# Patient Record
Sex: FEMALE | Race: WHITE | Employment: FULL TIME
[De-identification: names, ages, dates, MRNs, and addresses within clinical notes are randomized per-mention and may not be internally consistent; named-entity substitution may affect disease eponyms.]

---

## 2017-01-09 ENCOUNTER — SURGERY (OUTPATIENT)
Age: 47
End: 2017-01-09

## 2017-02-07 ENCOUNTER — HOSPITAL ENCOUNTER (OUTPATIENT)
Facility: CLINIC | Age: 47
End: 2017-02-07
Attending: OPHTHALMOLOGY | Admitting: OPHTHALMOLOGY
Payer: COMMERCIAL

## 2017-02-20 ENCOUNTER — HOSPITAL ENCOUNTER (OUTPATIENT)
Facility: CLINIC | Age: 47
Discharge: HOME | End: 2017-02-20
Attending: OPHTHALMOLOGY | Admitting: OPHTHALMOLOGY
Payer: COMMERCIAL

## 2017-02-20 ENCOUNTER — SURGERY (OUTPATIENT)
Age: 47
End: 2017-02-20

## 2017-02-20 PROCEDURE — 65760 KERATOMILEUSIS: CPT | Performed by: OPHTHALMOLOGY

## 2017-02-20 NOTE — OR NURSING
I have completed the appropriate paper charting.  Please see paper charting for meds and other documentation.

## 2017-03-17 ENCOUNTER — HOSPITAL ENCOUNTER (EMERGENCY)
Facility: CLINIC | Age: 47
Discharge: HOME | End: 2017-03-17
Attending: EMERGENCY MEDICINE | Admitting: EMERGENCY MEDICINE
Payer: COMMERCIAL

## 2017-03-17 VITALS
RESPIRATION RATE: 18 BRPM | WEIGHT: 173 LBS | SYSTOLIC BLOOD PRESSURE: 125 MMHG | BODY MASS INDEX: 29.7 KG/M2 | DIASTOLIC BLOOD PRESSURE: 81 MMHG | TEMPERATURE: 98 F | OXYGEN SATURATION: 99 % | HEART RATE: 77 BPM

## 2017-03-17 DIAGNOSIS — N39.0 UTI (URINARY TRACT INFECTION) WITH PYURIA: ICD-10-CM

## 2017-03-17 LAB
ALBUMIN UR-MCNC: NEGATIVE MG/DL
APPEARANCE UR: ABNORMAL
BACTERIA #/AREA URNS HPF: ABNORMAL /HPF
BILIRUB UR QL STRIP: NEGATIVE
COLOR UR AUTO: ABNORMAL
GLUCOSE UR STRIP-MCNC: NEGATIVE MG/DL
HCG UR QL: NEGATIVE
HGB UR QL STRIP: ABNORMAL
KETONES UR STRIP-MCNC: NEGATIVE MG/DL
LEUKOCYTE ESTERASE UR QL STRIP: ABNORMAL
MUCOUS THREADS #/AREA URNS LPF: PRESENT /LPF
NITRATE UR QL: POSITIVE
PH UR STRIP: 7 PH (ref 5–7)
RBC #/AREA URNS AUTO: 7 /HPF (ref 0–2)
SP GR UR STRIP: 1 (ref 1–1.03)
SQUAMOUS #/AREA URNS AUTO: 3 /HPF (ref 0–1)
URN SPEC COLLECT METH UR: ABNORMAL
UROBILINOGEN UR STRIP-MCNC: 0 MG/DL (ref 0–2)
WBC #/AREA URNS AUTO: >182 /HPF (ref 0–2)
WBC CLUMPS #/AREA URNS HPF: PRESENT /HPF

## 2017-03-17 PROCEDURE — 81001 URINALYSIS AUTO W/SCOPE: CPT | Performed by: EMERGENCY MEDICINE

## 2017-03-17 PROCEDURE — 99283 EMERGENCY DEPT VISIT LOW MDM: CPT

## 2017-03-17 PROCEDURE — 81025 URINE PREGNANCY TEST: CPT | Performed by: EMERGENCY MEDICINE

## 2017-03-17 RX ORDER — NITROFURANTOIN 25; 75 MG/1; MG/1
100 CAPSULE ORAL 2 TIMES DAILY
Qty: 20 CAPSULE | Refills: 0 | Status: SHIPPED | OUTPATIENT
Start: 2017-03-17 | End: 2017-03-27

## 2017-03-17 ASSESSMENT — ENCOUNTER SYMPTOMS
FEVER: 0
FREQUENCY: 1
FLANK PAIN: 0
HEMATURIA: 1

## 2017-03-17 NOTE — DISCHARGE INSTRUCTIONS
"   * BLADDER INFECTION,Female (Adult)    A bladder infection (\"cystitis\" or \"UTI\") usually causes a constant urge to urinate and a burning when passing urine. Urine may be cloudy, smelly or dark. There may be pain in the lower abdomen. A bladder infection occurs when bacteria from the vaginal area enter the bladder opening (urethra). This can occur from sexual intercourse, wearing tight clothing, dehydration and other factors.  HOME CARE:  1. Drink lots of fluids (at least 6-8 glasses a day, unless you must restrict fluids for other medical reasons). This will force the medicine into your urinary system and flush the bacteria out of your body. Cranberry juice has been shown to help clear out the bacteria.  2. Avoid sexual intercourse until your symptoms are gone.  3. A bladder infection is treated with antibiotics. You may also be given Pyridium (generic = phenazopyridine) to reduce the burning sensation. This medicine will cause your urine to become a bright orange color. The orange urine may stain clothing. You may wear a pad or panty-liner to protect clothing.  PREVENTING FUTURE INFECTIONS:  1. Always wipe from front to back after a bowel movement.  2. Keep the genital area clean and dry.  3. Drink plenty of fluids each day to avoid dehydration.  4. Urinate right after intercourse to flush out the bladder.  5. Wear cotton underwear and cotton-lined panty hose; avoid tight-fitting pants.  6. If you are on birth control pills and are having frequent bladder infections, discuss with your doctor.  FOLLOW UP: Return to this facility or see your doctor if ALL symptoms are not gone after three days of treatment.  GET PROMPT MEDICAL ATTENTION if any of the following occur:    Fever over 101 F (38.3 C)    No improvement by the third day of treatment    Increasing back or abdominal pain    Repeated vomiting; unable to keep medicine down    Weakness, dizziness or fainting    Vaginal discharge    Pain, redness or swelling in " the labia (outer vaginal area)    7154-1080 [employer], [address] [place] [address], [place] [address]. All rights reserved. This information is not intended as a substitute for professional medical care. Always follow your healthcare professional's instructions.

## 2017-03-17 NOTE — ED NOTES
D/c instructions reviewed with pt educated on follow-up and home care, given prescription for Macrobid

## 2017-03-17 NOTE — ED AVS SNAPSHOT
[facility] Emergency Department    [address]    [facility] [address]    Phone:  [phone]    Fax:  [phone]                                       Billie Tavarez   MRN: [id number]    Department:  [facility] Emergency Department   Date of Visit:  3/17/2017           After Visit Summary Signature Page     I have received my discharge instructions, and my questions have been answered. I have discussed any challenges I see with this plan with the nurse or doctor.    ..........................................................................................................................................  Patient/Patient Representative Signature      ..........................................................................................................................................  Patient Representative Print Name and Relationship to Patient    ..................................................               ................................................  Date                                            Time    ..........................................................................................................................................  Reviewed by Signature/Title    ...................................................              ..............................................  Date                                                            Time

## 2017-03-17 NOTE — ED PROVIDER NOTES
History     Chief Complaint:  Rule out Urinary Tract Infection      The history is provided by the patient.      Billie Tavarez is a 46 year old female who presents to rule out urinary tract infection.  Patient had onset of urinary frequency yesterday.  She took AZO twice yesterday but then experienced hematuria in the evening prompting visit to the emergency department.  She has had urinary tract infections in remote past but does note this is similar to past UTIs.  She denies fevers, flank pain, or other complaint. No vomiting or flank pain or fevers to suggest pyelonephritis.    Allergies:  Codeine  Sulfa Drugs      Medications:    Hydroquinone cream  Ativan  Ortho evra patch  Omeprazole  Vitamin D    Past Medical History:    Rosacea  Inflamed seborrheic keratosis   Dyschromia  Colon polyp  Obesity  Anxiety  Diarrhea  Atopic rhinits  GERD  Depression   IBS  Gluten intolerance   UTI's    Past Surgical History:    Right hip arthroplasty  Tubal ligation  Tonsillectomy  Lasik    Family History:    Diabetes  CAD  HTN  Mood disorder  OA    Social History:  Presents with spouse   Tobacco use: Former 0.50 PPD for 6 years, QD 1995  Alcohol use: Socially  PCP: Davey Arteaga    Marital Status:          Review of Systems   Constitutional: Negative for fever.   Genitourinary: Positive for frequency and hematuria. Negative for flank pain.   All other systems reviewed and are negative.  Here with a 24 hour history of frequency . Has had 6 bladder infections in her lifetime. Now tonight noted blood in urine. Says she has started on AZO earlier today     Physical Exam     Patient Vitals for the past 24 hrs:   BP Temp Pulse Resp SpO2 Weight   03/17/17 0036 133/84 98  F (36.7  C) 77 18 100 % 78.5 kg (173 lb)        Physical Exam  GEN: patient smiling, no distress  HEAD: atraumatic, normocephalic  EYES: pupils reactive, extraocular muscles intact, conjunctivae normal  ENT: TMs flat and white bilaterally, oropharynx  normal with no erythema or exudate, mucus membranes moist  NECK: no cervical LAD  RESPIRATORY: no tachypnea, breath sounds clear to auscultation (no rales, wheezes, rhonchi)  CVS: normal S1/S2, no murmurs/rubs/gallops  ABDOMEN: soft, nontender, no masses or organomegaly, no rebound, positive bowel sounds  EXTREMITIES: intact pulses x 2 (radial pulses intact), no edema  SKIN: warm and dry  NEURO:  Overall symmetrical exam  HEME: no bruising      Emergency Department Course   Laboratory:  UA: Blood large, Nitrite positive, Leuk esterase large, WBC>182 (H), RBC 7 (H), WBC clumps present, Bacteria moderate, Squam epithelial 3 (H), Mucous present, o/w Negative   UPT: Negative    Emergency Department Course:  Past medical records, nursing notes, and vitals reviewed.  0057: I performed an exam of the patient and obtained history, as documented above.   Above workup undertaken.  I personally reviewed the laboratory results with the Patient and spouse and answered all related questions prior to discharge.   0125: I rechecked the patient.  Findings and plan explained to the Patient and spouse. Patient discharged home with instructions regarding supportive care, medications, and reasons to return. The importance of close follow-up was reviewed.        Impression & Plan    Medical Decision Making:  Billie Tavarez is a 46 year old female who presents for evaluation of urinary frequency and hematuria.  This clinically is consistent with a urinary tract infection.  Urinalysis confirms the infection.  There has been no fever, back/flank pain or significant abdominal pain.  There is no clinical evidence of pyelonephritis, appendicitis, colitis, diverticulitis or any intraabdominal catastrophe. The patient will be started on antibiotics for the infection. Return if increasing pain, vomiting, fever, or inability to tolerate the oral antibiotic.  Follow up with primary physician is indicated if not improving in 2-3 days.       Diagnosis:    ICD-10-CM    1. UTI (urinary tract infection) with pyuria N39.0        Disposition:  Discharged to home with plan as outlined.    Discharge Medications:  New Prescriptions    NITROFURANTOIN, MACROCRYSTAL-MONOHYDRATE, (MACROBID) 100 MG CAPSULE    Take 1 capsule (100 mg) by mouth 2 times daily for 10 days         Chas Tariq  3/17/2017   [facility] EMERGENCY DEPARTMENT    I, Chas Tariq, am serving as a scribe at 12:57 AM on 3/17/2017 to document services personally performed by Dianne Quiroga MD based on my observations and the provider's statements to me.       Dianne Quiroga MD  03/17/17 0334

## 2017-03-17 NOTE — ED AVS SNAPSHOT
" [facility] Emergency Department    [address] [place] [address]    Phone:  [phone]    Fax:  [phone]                                       Billie Tavarez   MRN: [id number]    Department:  [facility] Emergency Department   Date of Visit:  3/17/2017           Patient Information     Date Of Birth          1970        Your diagnoses for this visit were:     UTI (urinary tract infection) with pyuria        You were seen by Dianne Quiroga MD.      Follow-up Information     Follow up with Davey Arteaga PA-C.    Specialty:  Physician Assistant    Contact information:    [facility]  [address]  [facility] [address] [phone]          Discharge Instructions          * BLADDER INFECTION,Female (Adult)    A bladder infection (\"cystitis\" or \"UTI\") usually causes a constant urge to urinate and a burning when passing urine. Urine may be cloudy, smelly or dark. There may be pain in the lower abdomen. A bladder infection occurs when bacteria from the vaginal area enter the bladder opening (urethra). This can occur from sexual intercourse, wearing tight clothing, dehydration and other factors.  HOME CARE:  1. Drink lots of fluids (at least 6-8 glasses a day, unless you must restrict fluids for other medical reasons). This will force the medicine into your urinary system and flush the bacteria out of your body. Cranberry juice has been shown to help clear out the bacteria.  2. Avoid sexual intercourse until your symptoms are gone.  3. A bladder infection is treated with antibiotics. You may also be given Pyridium (generic = phenazopyridine) to reduce the burning sensation. This medicine will cause your urine to become a bright orange color. The orange urine may stain clothing. You may wear a pad or panty-liner to protect clothing.  PREVENTING FUTURE INFECTIONS:  1. Always wipe from front to back after a bowel movement.  2. Keep " the genital area clean and dry.  3. Drink plenty of fluids each day to avoid dehydration.  4. Urinate right after intercourse to flush out the bladder.  5. Wear cotton underwear and cotton-lined panty hose; avoid tight-fitting pants.  6. If you are on birth control pills and are having frequent bladder infections, discuss with your doctor.  FOLLOW UP: Return to this facility or see your doctor if ALL symptoms are not gone after three days of treatment.  GET PROMPT MEDICAL ATTENTION if any of the following occur:    Fever over 101 F (38.3 C)    No improvement by the third day of treatment    Increasing back or abdominal pain    Repeated vomiting; unable to keep medicine down    Weakness, dizziness or fainting    Vaginal discharge    Pain, redness or swelling in the labia (outer vaginal area)    8643-8873 [employer], [address] [place] [address], [place] [id number]. All rights reserved. This information is not intended as a substitute for professional medical care. Always follow your healthcare professional's instructions.      24 Hour Appointment Hotline       To make an appointment at any [facility], call [phone] ([phone]). If you don't have a family doctor or clinic, we will help you find one. [facility] are conveniently located to serve the needs of you and your family.             Review of your medicines      START taking        Dose / Directions Last dose taken    nitrofurantoin (macrocrystal-monohydrate) 100 MG capsule   Commonly known as:  MACROBID   Dose:  100 mg   Quantity:  20 capsule        Take 1 capsule (100 mg) by mouth 2 times daily for 10 days   Refills:  0          Our records show that you are taking the medicines listed below. If these are incorrect, please call your family doctor or clinic.        Dose / Directions Last dose taken    fluticasone 50 MCG/ACT spray   Commonly known as:  FLONASE   Dose:  1-2 spray   Quantity:  1 Bottle        Spray 1-2 sprays into both  nostrils daily   Refills:  1        hydroquinone 4 % Crea   Quantity:  30 g        Apply thin layer BID x 4-6 months then d/c   Refills:  5        LORazepam 0.5 MG tablet   Commonly known as:  ATIVAN   Dose:  0.5 mg   Quantity:  20 tablet        Take 1 tablet (0.5 mg) by mouth as needed for anxiety   Refills:  0        norelgestromin-ethinyl estradiol 150-35 MCG/24HR patch   Commonly known as:  ORTHO EVRA   Dose:  1 patch   Quantity:  12 patch        Place 1 patch onto the skin once a week   Refills:  3        omeprazole 20 MG tablet        Refills:  0        vitamin D 2000 UNITS tablet   Dose:  1 tablet        Take 1 tablet by mouth daily   Refills:  0                Prescriptions were sent or printed at these locations (1 Prescription)                   Other Prescriptions                Printed at Department/Unit printer (1 of 1)         nitrofurantoin, macrocrystal-monohydrate, (MACROBID) 100 MG capsule                Procedures and tests performed during your visit     HCG qualitative urine    UA with Microscopic      Orders Needing Specimen Collection     None      Pending Results     No orders found from 3/15/2017 to 3/18/2017.            Pending Culture Results     No orders found from 3/15/2017 to 3/18/2017.             Test Results from your hospital stay     3/17/2017  1:14 AM - Interface, [employer] [facility] Results      Component Results     Component Value Ref Range & Units Status    Color Urine [place]  Final    Appearance Urine Slightly Cloudy  Final    Glucose Urine Negative NEG mg/dL Final    Bilirubin Urine Negative NEG Final    Ketones Urine Negative NEG mg/dL Final    Specific Gravity Urine 1.003 1.003 - 1.035 Final    Blood Urine Large (A) NEG Final    pH Urine 7.0 5.0 - 7.0 pH Final    Protein Albumin Urine Negative NEG mg/dL Final    Urobilinogen mg/dL 0.0 0.0 - 2.0 mg/dL Final    Nitrite Urine Positive (A) NEG Final    Leukocyte Esterase Urine Large (A) NEG Final    Source Midstream Urine  Final    WBC  Urine >182 (H) 0 - 2 /HPF Final    RBC Urine 7 (H) 0 - 2 /HPF Final    WBC Clumps Present (A) NEG /HPF Final    Bacteria Urine Moderate (A) NEG /HPF Final    Squamous Epithelial /HPF Urine 3 (H) 0 - 1 /HPF Final    Mucous Urine Present (A) NEG /LPF Final         3/17/2017  1:11 AM - Interface, [employer] Results      Component Results     Component Value Ref Range & Units Status    HCG Qual Urine Negative NEG Final                Clinical Quality Measure: Blood Pressure Screening     Your blood pressure was checked while you were in the emergency department today. The last reading we obtained was  BP: 133/84 . Please read the guidelines below about what these numbers mean and what you should do about them.  If your systolic blood pressure (the top number) is less than 120 and your diastolic blood pressure (the bottom number) is less than 80, then your blood pressure is normal. There is nothing more that you need to do about it.  If your systolic blood pressure (the top number) is 120-139 or your diastolic blood pressure (the bottom number) is 80-89, your blood pressure may be higher than it should be. You should have your blood pressure rechecked within a year by a primary care provider.  If your systolic blood pressure (the top number) is 140 or greater or your diastolic blood pressure (the bottom number) is 90 or greater, you may have high blood pressure. High blood pressure is treatable, but if left untreated over time it can put you at risk for heart attack, stroke, or kidney failure. You should have your blood pressure rechecked by a primary care provider within the next 4 weeks.  If your provider in the emergency department today gave you specific instructions to follow-up with your doctor or provider even sooner than that, you should follow that instruction and not wait for up to 4 weeks for your follow-up visit.        Thank you for choosing [facility]       Thank you for choosing [facility] for your care. Our  goal is always to provide you with excellent care. Hearing back from our patients is one way we can continue to improve our services. Please take a few minutes to complete the written survey that you may receive in the mail after you visit with us. Thank you!        [employer] Information     [employer] gives you secure access to your electronic health record. If you see a primary care provider, you can also send messages to your care team and make appointments. If you have questions, please call your primary care clinic.  If you do not have a primary care provider, please call [phone] and they will assist you.        Care EveryWhere ID     This is your Care EveryWhere ID. This could be used by other organizations to access your [facility] medical records  [id number]        After Visit Summary       This is your record. Keep this with you and show to your community pharmacist(s) and doctor(s) at your next visit.

## 2017-03-17 NOTE — ED NOTES
Here with a 24 hour history of frequency . Has had 6 bladder infections in her lifetime. Now tonight noted blood in urine. Says she has started on AZO earlier today

## 2017-03-20 ENCOUNTER — TELEPHONE (OUTPATIENT)
Dept: FAMILY MEDICINE | Facility: CLINIC | Age: 47
End: 2017-03-20

## 2017-03-20 DIAGNOSIS — N30.01 ACUTE CYSTITIS WITH HEMATURIA: Primary | ICD-10-CM

## 2017-03-20 RX ORDER — CEFDINIR 300 MG/1
300 CAPSULE ORAL 2 TIMES DAILY
Qty: 14 CAPSULE | Refills: 0 | Status: SHIPPED | OUTPATIENT
Start: 2017-03-20 | End: 2017-03-21 | Stop reason: ALTCHOICE

## 2017-03-20 NOTE — TELEPHONE ENCOUNTER
Spoke with pt let her know prescription was sent to pharmacy. Also that if symptoms continue she will need a follow up visit.  Corey Manuel CMA

## 2017-03-20 NOTE — TELEPHONE ENCOUNTER
Pt calls.  She was seen in the ER, with a uti.  She was started on an antibiotic.  She says she was told if it was not getting better, she should call her primary to get it changed.  It looks like the pt was to f/u with pcp in 2-3 days if not improving.  She says she has mild symptoms.  She feels like she has to go, but it is not that urgency, mild spasming, but nothing terrible and she says she still feels really off.      She says usually she is cleared by now and feeling amazing with other utis.      It improved initially.  She does not feel as good today as yesterday.  She is drinking lots of fluids.      Will forward to Alejandro Arteaga for advisal.

## 2017-03-21 ENCOUNTER — TELEPHONE (OUTPATIENT)
Dept: FAMILY MEDICINE | Facility: CLINIC | Age: 47
End: 2017-03-21

## 2017-03-21 DIAGNOSIS — R31.9 URINARY TRACT INFECTION WITH HEMATURIA, SITE UNSPECIFIED: Primary | ICD-10-CM

## 2017-03-21 DIAGNOSIS — N39.0 URINARY TRACT INFECTION WITH HEMATURIA, SITE UNSPECIFIED: Primary | ICD-10-CM

## 2017-03-21 RX ORDER — CIPROFLOXACIN 500 MG/1
500 TABLET, FILM COATED ORAL 2 TIMES DAILY
Qty: 14 TABLET | Refills: 0 | Status: SHIPPED | OUTPATIENT
Start: 2017-03-21 | End: 2017-05-31

## 2017-03-21 NOTE — TELEPHONE ENCOUNTER
Patient is calling back today, she has not picked up the antibiotic yet, is wondering if the antibiotic you prescribed will also cover her for a cat scratch that she got last night?  She is also using bacitracin and band aid to the scratch. It is not red or draining. (cefdinir)  Rosario Bellamy, RN  Triage Nurse

## 2017-03-21 NOTE — TELEPHONE ENCOUNTER
Ok i will change. Have her start the antibiotic and keep the area clean. She should follow up if not improving.

## 2017-05-31 ENCOUNTER — OFFICE VISIT (OUTPATIENT)
Dept: FAMILY MEDICINE | Facility: CLINIC | Age: 47
End: 2017-05-31
Payer: COMMERCIAL

## 2017-05-31 VITALS
DIASTOLIC BLOOD PRESSURE: 74 MMHG | HEIGHT: 64 IN | HEART RATE: 67 BPM | WEIGHT: 170.7 LBS | OXYGEN SATURATION: 97 % | SYSTOLIC BLOOD PRESSURE: 118 MMHG | BODY MASS INDEX: 29.14 KG/M2 | TEMPERATURE: 98.3 F

## 2017-05-31 DIAGNOSIS — N90.89 LESION OF LABIA: Primary | ICD-10-CM

## 2017-05-31 PROCEDURE — 99213 OFFICE O/P EST LOW 20 MIN: CPT | Performed by: PHYSICIAN ASSISTANT

## 2017-05-31 ASSESSMENT — ENCOUNTER SYMPTOMS
ABDOMINAL PAIN: 0
HEADACHES: 0
DIARRHEA: 0
VOMITING: 0
FEVER: 0
NAUSEA: 0
SHORTNESS OF BREATH: 0
FOCAL WEAKNESS: 0
CHILLS: 0

## 2017-05-31 NOTE — PROGRESS NOTES
HPI  SUBJECTIVE:      Billie Tavarez is a 47 year old female who presents to clinic today for the following health issues:     Lump around vaginal area       Duration: 2 days     Description (location/character/radiation): Lump in the labia on L side. Hard and internal. No pain     Intensity:  mild    Accompanying signs and symptoms: none     History (similar episodes/previous evaluation): None    Precipitating or alleviating factors: None    Therapies tried and outcome: None         Additional complaints: None    HPI additional notes:  Pt denies vaginal discharge, urinary sx, or tenderness/pain. Lump has not increased in size.       Chart Review:  History   Smoking Status     Former Smoker     Packs/day: 0.50     Years: 6.00     Types: Cigarettes     Quit date: 3/3/1995   Smokeless Tobacco     Never Used     Comment: quit 1994     PHQ-9 SCORE 4/20/2015 10/20/2015 10/31/2016   Total Score 0 - -   Total Score - 4 5     SHON-7 SCORE 10/20/2015 5/6/2016 10/31/2016   Total Score - - -   Total Score 14 6 8       Patient Active Problem List   Diagnosis     Mild major depression (H)     CARDIOVASCULAR SCREENING; LDL GOAL LESS THAN 160     GERD (gastroesophageal reflux disease)     Atopic rhinitis     Family history of diabetes mellitus     Family history of coronary artery disease     Elevated rheumatoid factor     Obesity     Anxiety     Diarrhea     Colon polyp     Rash     Rosacea     Inflamed seborrheic keratosis     Dyschromia     Vitamin D deficiency     Past Surgical History:   Procedure Laterality Date     ARTHROPLASTY HIP      R     ENHANCE LASER REFRACTIVE EXISTING PT IN PARAMETERS Right 2/20/2017    Procedure: ENHANCE LASER REFRACTIVE EXISTING PT IN PARAMETERS;  Surgeon: Leandro Martinez MD;  Location: [facility]     LASIK Left 1/18/2016    Procedure: LASIK;  Surgeon: Leandro Martinez MD;  Location: [facility]     LASIK CUSTOMVUE Right 1/9/2017    Procedure: LASIK CUSTOMVUE;  Surgeon: Leandro Martinez MD;  Location:  " [facility]     TONSILLECTOMY       TUBAL LIGATION       WAVESCAN SCREENING Left 1/18/2016    Procedure: WAVESCAN SCREENING;  Surgeon: Leandro Martinez MD;  Location:  [facility]     WAVESCAN SCREENING Right 1/9/2017    Procedure: WAVESCAN SCREENING;  Surgeon: Laendro Martinez MD;  Location:  [facility]     WAVESCAN SCREENING Right 2/20/2017    Procedure: WAVESCAN SCREENING;  Surgeon: Leandro Martinez MD;  Location: [facility]     Problem list, Medication list, Allergies, Medical/Social/Surg hx reviewed in [facility], updated as appropriate.      Review of Systems   Constitutional: Negative for chills and fever.   Respiratory: Negative for shortness of breath.    Cardiovascular: Negative for chest pain.   Gastrointestinal: Negative for abdominal pain, diarrhea, nausea and vomiting.   Genitourinary:        Lump on labia   Skin: Negative for rash.   Neurological: Negative for focal weakness and headaches.   All other systems reviewed and are negative.        Physical Exam   Constitutional: She is oriented to person, place, and time and well-developed, well-nourished, and in no distress.   HENT:   Head: Normocephalic and atraumatic.   Cardiovascular: Normal rate, regular rhythm and normal heart sounds.    Pulmonary/Chest: Effort normal and breath sounds normal.   Genitourinary: Vulva exhibits lesion (0.5 cm firm round papule with regular borders to L labia minora- pink in color with a small gray area. No tenderness). Vulva exhibits no tenderness.   Musculoskeletal: Normal range of motion.   Neurological: She is alert and oriented to person, place, and time. Gait normal.   Skin: Skin is warm and dry.   Nursing note and vitals reviewed.      Vital Signs  /74  Pulse 67  Temp 98.3  F (36.8  C) (Oral)  Ht 5' 4\" (1.626 m)  Wt 170 lb 11.2 oz (77.4 kg)  SpO2 97%  BMI 29.3 kg/m2   Body mass index is 29.3 kg/(m^2).    Diagnostic Test Results:  none     ASSESSMENT/PLAN:                                                        ICD-10-CM    1. " Lesion of labia N90.89      Lesion of unknown etiology to left labia. Does not appear infectious. Regular borders are reassuring as I feel a malignancy is less likely, but I would like pt to f/u with gynecology for further work-up. An appt was made for 6/15 with GYN.    I have discussed any lab or imaging results, the patient's diagnosis, and my plan of treatment with the patient and/or family. Patient is aware to come back in if with worsening symptoms or if no relief despite treatment plan.  Patient voiced understanding and had no further questions.       Follow Up: Data Unavailable    JULIA Amor, PA-C  [facility]

## 2017-05-31 NOTE — PROGRESS NOTES
"Chief Complaint   Patient presents with     Vaginal Problem       Initial /74  Pulse 67  Temp 98.3  F (36.8  C) (Oral)  Ht 5' 4\" (1.626 m)  Wt 170 lb 11.2 oz (77.4 kg)  SpO2 97%  BMI 29.3 kg/m2 Estimated body mass index is 29.3 kg/(m^2) as calculated from the following:    Height as of this encounter: 5' 4\" (1.626 m).    Weight as of this encounter: 170 lb 11.2 oz (77.4 kg).  Medication Reconciliation: complete     Carmen Higuera MA      "

## 2017-05-31 NOTE — MR AVS SNAPSHOT
After Visit Summary   5/31/2017    Billie Tavarez    MRN: [id number]           Patient Information     Date Of Birth          1970        Visit Information        Provider Department      5/31/2017 1:20 PM Kaela Stevens PA-C [facility]        Today's Diagnoses     Lesion of labia    -  1      Care Instructions    Follow up with gynecology on 6/15 at 3:15pm.              Follow-ups after your visit        Your next 10 appointments already scheduled     Tom 15, 2017  3:15 PM CDT   SHORT with Merline [place] MD   [facility]    [address] [place] [address] [phone]              Who to contact     If you have questions or need follow up information about today's clinic visit or your schedule please contact [facility] directly at [phone].  Normal or non-critical lab and imaging results will be communicated to you by MyChart, letter or phone within 4 business days after the clinic has received the results. If you do not hear from us within 7 days, please contact the clinic through [employer] or phone. If you have a critical or abnormal lab result, we will notify you by phone as soon as possible.  Submit refill requests through Sandag or call your pharmacy and they will forward the refill request to us. Please allow 3 business days for your refill to be completed.          Additional Information About Your Visit        MyChart Information     Sandag gives you secure access to your electronic health record. If you see a primary care provider, you can also send messages to your care team and make appointments. If you have questions, please call your primary care clinic.  If you do not have a primary care provider, please call [phone] and they will assist you.        Care EveryWhere ID     This is your Care EveryWhere ID. This could be used by other organizations to access your  "[place] medical records  [id number]        Your Vitals Were     Pulse Temperature Height Pulse Oximetry BMI (Body Mass Index)       67 98.3  F (36.8  C) (Oral) 5' 4\" (1.626 m) 97% 29.3 kg/m2        Blood Pressure from Last 3 Encounters:   05/31/17 118/74   03/17/17 125/81   11/18/16 118/78    Weight from Last 3 Encounters:   05/31/17 170 lb 11.2 oz (77.4 kg)   03/17/17 173 lb (78.5 kg)   10/19/16 186 lb 14.4 oz (84.8 kg)              Today, you had the following     No orders found for display       Primary Care Provider Office Phone # Fax #    Davey Arteaga PA-C [phone]       [facility] [address]  [facility] [address]        Thank you!     Thank you for choosing [facility]  for your care. Our goal is always to provide you with excellent care. Hearing back from our patients is one way we can continue to improve our services. Please take a few minutes to complete the written survey that you may receive in the mail after your visit with us. Thank you!             Your Updated Medication List - Protect others around you: Learn how to safely use, store and throw away your medicines at www.disposemymeds.org.          This list is accurate as of: 5/31/17  1:54 PM.  Always use your most recent med list.                   Brand Name Dispense Instructions for use    fluticasone 50 MCG/ACT spray    FLONASE    1 Bottle    Spray 1-2 sprays into both nostrils daily       hydroquinone 4 % Crea     30 g    Apply thin layer BID x 4-6 months then d/c       LORazepam 0.5 MG tablet    ATIVAN    20 tablet    Take 1 tablet (0.5 mg) by mouth as needed for anxiety       vitamin D 2000 UNITS tablet      Take 1 tablet by mouth daily         "

## 2017-05-31 NOTE — PROGRESS NOTES
"  SUBJECTIVE:                                                    Billie Tavarez is a 47 year old female who presents to clinic today for the following health issues:      Lump around vaginal area      Duration: 2 days     Description (location/character/radiation): Lump in the labia. Hard and internal. No pain     Intensity:  mild    Accompanying signs and symptoms: none     History (similar episodes/previous evaluation): None    Precipitating or alleviating factors: None    Therapies tried and outcome: None       {additional problems for provider to add:042592}    Problem list and histories reviewed & adjusted, as indicated.  Additional history: {NONE - AS DOCUMENTED:822500::\"as documented\"}    {HIST REVIEW/ LINKS 2:270083}    Reviewed and updated as needed this visit by clinical staff       Reviewed and updated as needed this visit by Provider         {PROVIDER CHARTING PREFERENCE:291463}    "

## 2017-06-06 ENCOUNTER — OFFICE VISIT (OUTPATIENT)
Dept: FAMILY MEDICINE | Facility: CLINIC | Age: 47
End: 2017-06-06
Payer: COMMERCIAL

## 2017-06-06 VITALS
SYSTOLIC BLOOD PRESSURE: 108 MMHG | HEART RATE: 73 BPM | BODY MASS INDEX: 28.85 KG/M2 | TEMPERATURE: 98.5 F | WEIGHT: 169 LBS | OXYGEN SATURATION: 97 % | DIASTOLIC BLOOD PRESSURE: 54 MMHG | HEIGHT: 64 IN

## 2017-06-06 DIAGNOSIS — R09.89 CHRONIC THROAT CLEARING: Primary | ICD-10-CM

## 2017-06-06 DIAGNOSIS — H93.8X3 CRACKLING SOUND IN BOTH EARS: ICD-10-CM

## 2017-06-06 DIAGNOSIS — F41.1 GAD (GENERALIZED ANXIETY DISORDER): ICD-10-CM

## 2017-06-06 DIAGNOSIS — J31.0 CHRONIC RHINITIS: ICD-10-CM

## 2017-06-06 PROCEDURE — 99213 OFFICE O/P EST LOW 20 MIN: CPT | Performed by: PHYSICIAN ASSISTANT

## 2017-06-06 RX ORDER — LORAZEPAM 0.5 MG/1
0.5 TABLET ORAL PRN
Qty: 20 TABLET | Refills: 0 | Status: SHIPPED | OUTPATIENT
Start: 2017-06-06 | End: 2017-09-11

## 2017-06-06 NOTE — PROGRESS NOTES
SUBJECTIVE:                                                    Billie Tavarez is a 47 year old female who presents to clinic today for the following health issues:    Ears      Duration: on/off several years    Description (location/character/radiation): bilateral ears    Intensity:  mild, moderate     Accompanying signs and symptoms: cough, PND, water in ears (nonalergic rhinitis)     History (similar episodes/previous evaluation): years    Precipitating or alleviating factors: None    Therapies tried and outcome: Claritin-D, Flonase      -Patient presents with a continued hx of bilateral ear sensation  -This has been a chronic problem, many years  -Consistent crackling, with increase will feel pressured  -Denies any change in hearing   -Has tried flonase, and claritin-D; moderately effective for small periods of time  -has been worked up extensively for allergies - no gross findings  -denies any true GERD    Problem list and histories reviewed & adjusted, as indicated.  Additional history: as documented    Patient Active Problem List   Diagnosis     Mild major depression (H)     CARDIOVASCULAR SCREENING; LDL GOAL LESS THAN 160     GERD (gastroesophageal reflux disease)     Atopic rhinitis     Family history of diabetes mellitus     Family history of coronary artery disease     Elevated rheumatoid factor     Obesity     Anxiety     Diarrhea     Colon polyp     Rash     Rosacea     Inflamed seborrheic keratosis     Dyschromia     Vitamin D deficiency     Past Surgical History:   Procedure Laterality Date     ARTHROPLASTY HIP      R     ENHANCE LASER REFRACTIVE EXISTING PT IN PARAMETERS Right 2/20/2017    Procedure: ENHANCE LASER REFRACTIVE EXISTING PT IN PARAMETERS;  Surgeon: Leandro Martinez MD;  Location: [facility]     LASIK Left 1/18/2016    Procedure: LASIK;  Surgeon: Leandro Martinez MD;  [facility]     LASIK CUSTOMVUE Right 1/9/2017    Procedure: LASIK CUSTOMVUE;  Surgeon: Leandro Martinez MD;   "Location:  [facility]     TONSILLECTOMY       TUBAL LIGATION       WAVESCAN SCREENING Left 1/18/2016    Procedure: WAVESCAN SCREENING;  Surgeon: Leandro Mratinez MD;  [facility]     WAVESCAN SCREENING Right 1/9/2017    Procedure: WAVESCAN SCREENING;  Surgeon: Leandro Martinez MD;  [facility]     WAVESCAN SCREENING Right 2/20/2017    Procedure: WAVESCAN SCREENING;  Surgeon: Leandro Martinez MD;  Location: [facility]       Social History   Substance Use Topics     Smoking status: Former Smoker     Packs/day: 0.50     Years: 6.00     Types: Cigarettes     Quit date: 3/3/1995     Smokeless tobacco: Never Used      Comment: quit 1994     Alcohol use 2.0 oz/week     4 Standard drinks or equivalent per week      Comment: socially     Family History   Problem Relation Age of Onset     DIABETES Mother      Hypertension Mother      C.A.D. Mother      2 stents     Arthritis Maternal Grandmother      osteo     C.A.D. Paternal Grandfather 47     MENTAL ILLNESS Daughter      Mood Disorder     C.A.D. Paternal Uncle            Reviewed and updated as needed this visit by clinical staff  Tobacco  Allergies  Med Hx  Surg Hx  Fam Hx  Soc Hx      Reviewed and updated as needed this visit by Provider         ROS:  Constitutional, HEENT, cardiovascular, pulmonary, gi and gu systems are negative, except as otherwise noted.    OBJECTIVE:                                                    /54  Pulse 73  Temp 98.5  F (36.9  C) (Oral)  Ht 5' 4\" (1.626 m)  Wt 169 lb (76.7 kg)  SpO2 97%  BMI 29.01 kg/m2  Body mass index is 29.01 kg/(m^2).  GENERAL: healthy, alert and no distress  HENT: normal cephalic/atraumatic, right ear: clear effusion, left ear: normal: no effusions, no erythema, normal landmarks and nose and mouth without ulcers or lesions  NECK: no adenopathy  RESP: lungs clear to auscultation - no rales, rhonchi or wheezes  CV: regular rates and rhythm    Diagnostic Test Results:  none      ASSESSMENT/PLAN:             "                                        1. Chronic throat clearing  2. Chronic rhinitis  3. Crackling sound in both ears  Chronic problem. There is fluid in the right ear today and she reports improvement in the left with recent trial of claritin D. Unclear if related to allergies or other sinus pathology. Given chronicity and failure of most otc tx will have her see ent.   - OTOLARYNGOLOGY REFERRAL    4. SHON (generalized anxiety disorder)  Refilling. Side effects with etoh and driving reviewed.  - LORazepam (ATIVAN) 0.5 MG tablet; Take 1 tablet (0.5 mg) by mouth as needed for anxiety  Dispense: 20 tablet; Refill: 0    Davey Arteaga PA-C  [facility]

## 2017-06-06 NOTE — MR AVS SNAPSHOT
After Visit Summary   6/6/2017    Billie Tavarez    MRN: [id number]           Patient Information     Date Of Birth          1970        Visit Information        Provider Department      6/6/2017 3:40 PM Davey Arteaga PA-C [facility]        Today's Diagnoses     Chronic throat clearing    -  1    Chronic rhinitis        Crackling sound in both ears           Follow-ups after your visit        Additional Services     OTOLARYNGOLOGY REFERRAL       Your provider has referred you to: N: [facility] [phone]   [email or web address]  N: [facility] [place] [phone]   [email or web address]    Please be aware that coverage of these services is subject to the terms and limitations of your health insurance plan.  Call member services at your health plan with any benefit or coverage questions.      Please bring the following with you to your appointment:    (1) Any X-Rays, CTs or MRIs which have been performed.  Contact the facility where they were done to arrange for  prior to your scheduled appointment.   (2) List of current medications  (3) This referral request   (4) Any documents/labs given to you for this referral                  Your next 10 appointments already scheduled     Tom 15, 2017  3:15 PM CDT   SHORT with Merline [place] MD   [facility] ([facility])    [address] [place] [id number] [phone]            Jun 23, 2017  9:30 AM CDT   Return Visit with Janae Kinney PA-C   [facility]    [address] [address] [phone]              Who to contact     If you have questions or need follow up information about today's clinic visit or your schedule please contact [facility]  [facility] directly at [phone].  Normal or non-critical lab and imaging results will be communicated to you by MyChart, letter or phone within 4 business days after the clinic has received the results. If you do not hear from us within 7 days, please contact the clinic through [employer] or phone. If you have a critical or abnormal lab result, we will notify you by phone as soon as possible.  Submit refill requests through [employer]U or call your pharmacy and they will forward the refill request to us. Please allow 3 business days for your refill to be completed.          Additional Information About Your Visit        [employer] Information     [employer] gives you secure access to your electronic health record. If you see a primary care provider, you can also send messages to your care team and make appointments. If you have questions, please call your primary care clinic.  If you do not have a primary care provider, please call [phone] and they will assist you.        Care EveryWhere ID     This is your Care EveryWhere ID. This could be used by other organizations to access your [place] medical records  [id number]        Your Vitals Were     Pulse Temperature Height Pulse Oximetry BMI (Body Mass Index)       73 98.5  F (36.9  C) (Oral) 5' 4\" (1.626 m) 97% 29.01 kg/m2        Blood Pressure from Last 3 Encounters:   06/06/17 108/54   05/31/17 118/74   03/17/17 125/81    Weight from Last 3 Encounters:   06/06/17 169 lb (76.7 kg)   05/31/17 170 lb 11.2 oz (77.4 kg)   03/17/17 173 lb (78.5 kg)              We Performed the Following     OTOLARYNGOLOGY REFERRAL        Primary Care Provider Office Phone # Fax #    Davey Arteaga PA-C [phone]       [facility] [address]  [facility] [address]        Thank you!     Thank you for choosing [facility]  for your care. Our goal is always to provide you with excellent care. Hearing back from our patients is one way we " can continue to improve our services. Please take a few minutes to complete the written survey that you may receive in the mail after your visit with us. Thank you!             Your Updated Medication List - Protect others around you: Learn how to safely use, store and throw away your medicines at www.disposemymeds.org.          This list is accurate as of: 6/6/17  4:18 PM.  Always use your most recent med list.                   Brand Name Dispense Instructions for use    fluticasone 50 MCG/ACT spray    FLONASE    1 Bottle    Spray 1-2 sprays into both nostrils daily       hydroquinone 4 % Crea     30 g    Apply thin layer BID x 4-6 months then d/c       LORazepam 0.5 MG tablet    ATIVAN    20 tablet    Take 1 tablet (0.5 mg) by mouth as needed for anxiety       vitamin D 2000 UNITS tablet      Take 1 tablet by mouth daily

## 2017-06-06 NOTE — NURSING NOTE
"Chief Complaint   Patient presents with     Ent Problem       Initial /54  Pulse 73  Temp 98.5  F (36.9  C) (Oral)  Ht 5' 4\" (1.626 m)  Wt 169 lb (76.7 kg)  SpO2 97%  BMI 29.01 kg/m2 Estimated body mass index is 29.01 kg/(m^2) as calculated from the following:    Height as of this encounter: 5' 4\" (1.626 m).    Weight as of this encounter: 169 lb (76.7 kg).  Medication Reconciliation: complete   Corey Manuel CMA        "

## 2017-06-08 ASSESSMENT — PATIENT HEALTH QUESTIONNAIRE - PHQ9: 5. POOR APPETITE OR OVEREATING: MORE THAN HALF THE DAYS

## 2017-06-08 ASSESSMENT — ANXIETY QUESTIONNAIRES
5. BEING SO RESTLESS THAT IT IS HARD TO SIT STILL: SEVERAL DAYS
2. NOT BEING ABLE TO STOP OR CONTROL WORRYING: SEVERAL DAYS
3. WORRYING TOO MUCH ABOUT DIFFERENT THINGS: SEVERAL DAYS
IF YOU CHECKED OFF ANY PROBLEMS ON THIS QUESTIONNAIRE, HOW DIFFICULT HAVE THESE PROBLEMS MADE IT FOR YOU TO DO YOUR WORK, TAKE CARE OF THINGS AT HOME, OR GET ALONG WITH OTHER PEOPLE: SOMEWHAT DIFFICULT
7. FEELING AFRAID AS IF SOMETHING AWFUL MIGHT HAPPEN: NOT AT ALL
6. BECOMING EASILY ANNOYED OR IRRITABLE: SEVERAL DAYS
GAD7 TOTAL SCORE: 8
1. FEELING NERVOUS, ANXIOUS, OR ON EDGE: MORE THAN HALF THE DAYS

## 2017-06-09 ASSESSMENT — ANXIETY QUESTIONNAIRES: GAD7 TOTAL SCORE: 8

## 2017-06-09 ASSESSMENT — PATIENT HEALTH QUESTIONNAIRE - PHQ9: SUM OF ALL RESPONSES TO PHQ QUESTIONS 1-9: 3

## 2017-06-15 ENCOUNTER — OFFICE VISIT (OUTPATIENT)
Dept: OBGYN | Facility: CLINIC | Age: 47
End: 2017-06-15
Payer: COMMERCIAL

## 2017-06-15 VITALS
SYSTOLIC BLOOD PRESSURE: 120 MMHG | HEART RATE: 96 BPM | DIASTOLIC BLOOD PRESSURE: 75 MMHG | WEIGHT: 168 LBS | BODY MASS INDEX: 28.84 KG/M2 | OXYGEN SATURATION: 97 %

## 2017-06-15 DIAGNOSIS — N90.7 LABIAL CYST: Primary | ICD-10-CM

## 2017-06-15 PROCEDURE — 56405 I&D VULVA/PERINEAL ABSCESS: CPT | Performed by: OBSTETRICS & GYNECOLOGY

## 2017-06-15 PROCEDURE — 99213 OFFICE O/P EST LOW 20 MIN: CPT | Mod: 25 | Performed by: OBSTETRICS & GYNECOLOGY

## 2017-06-15 NOTE — NURSING NOTE
"Chief Complaint   Patient presents with     RECHECK       Initial /75  Pulse 96  Wt 168 lb (76.2 kg)  SpO2 97%  Breastfeeding? No  BMI 28.84 kg/m2 Estimated body mass index is 28.84 kg/(m^2) as calculated from the following:    Height as of 17: 5' 4\" (1.626 m).    Weight as of this encounter: 168 lb (76.2 kg).  BP completed using cuff size: regular        The following HM Due: NONE      The following patient reported/Care Every where data was sent to:  P ABSTRACT QUALITY INITIATIVES [87566]  none     n/a             "

## 2017-06-15 NOTE — PROGRESS NOTES
Billie Tavarez is a 47 year old  who presents to assess vulvar lump.  She noted a small firm mass in her left labium several days ago.  The mass is non-tender, has not bled/drained/grown.  It does cause some irritation, and she would like to consider treatment options.     OBJECTIVE: /75  Pulse 96  Wt 168 lb (76.2 kg)  SpO2 97%  Breastfeeding? No  BMI 28.84 kg/m2     Pelvic exam:  External genitalia examined, and a 1 cm firm raised cystic mass consistent with a small sebaceous or inclusion cyst is noted in the left labium.  Urethral meatus, urethra, bladder, perineum, and anus appeared otherwise normal.     We discussed options, including conservative observation and drainage.  Risks/benefits of surgical drainage discussed.  We discussed possible diagnoses.     Informed consent obtained.  Betadine prep employed.  0.2 cc of 1% lidocaine with epinephrine injected into the cyst.  A small stab wound is made with a #11 scalpel, and sebaceous material extruded.  A small hemostat was employed to explore the cyst, and no further material was drained.  Hemostasis obtained with pressure, blood loss <1 cc.  She tolerated this well.     ASSESSMENT:  Sebaceous cyst of vulva, incised and drained.     PLAN: Standard precautions/activity/wound care.  RTC prn.      In addition to the procedure, I spent 15 minutes with the patient, with more than 50% spent in counseling/discussing the diagnosis and treatment options.

## 2017-06-15 NOTE — MR AVS SNAPSHOT
After Visit Summary   6/15/2017    Billie Tavarez    MRN: [id number]           Patient Information     Date Of Birth          1970        Visit Information        Provider Department      6/15/2017 3:15 PM Merline [place] MD [facility]        Today's Diagnoses     Labial cyst    -  1       Follow-ups after your visit        Your next 10 appointments already scheduled     Jun 23, 2017  9:30 AM CDT   Return Visit with Janae Kinney PA-C   [facility]    [address] [address] [phone]              Who to contact     If you have questions or need follow up information about today's clinic visit or your schedule please contact [facility] directly at [phone].  Normal or non-critical lab and imaging results will be communicated to you by MyChart, letter or phone within 4 business days after the clinic has received the results. If you do not hear from us within 7 days, please contact the clinic through MyChart or phone. If you have a critical or abnormal lab result, we will notify you by phone as soon as possible.  Submit refill requests through [employer] or call your pharmacy and they will forward the refill request to us. Please allow 3 business days for your refill to be completed.          Additional Information About Your Visit        MyChart Information     [employer] gives you secure access to your electronic health record. If you see a primary care provider, you can also send messages to your care team and make appointments. If you have questions, please call your primary care clinic.  If you do not have a primary care provider, please call [phone] and they will assist you.        Care EveryWhere ID     This is your Care EveryWhere ID. This could be used by other organizations to access your [place] medical records  [id number]        Your Vitals Were     Pulse  Pulse Oximetry Breastfeeding? BMI (Body Mass Index)          96 97% No 28.84 kg/m2         Blood Pressure from Last 3 Encounters:   06/15/17 120/75   06/06/17 108/54   05/31/17 118/74    Weight from Last 3 Encounters:   06/15/17 168 lb (76.2 kg)   06/06/17 169 lb (76.7 kg)   05/31/17 170 lb 11.2 oz (77.4 kg)              We Performed the Following     I&D VULVA/PERINEAL ABSCESS        Primary Care Provider Office Phone # Fax #    Davey Arteaga PA-C [phone]       [facility] [address]  [facility] [address]        Thank you!     Thank you for choosing [facility]  for your care. Our goal is always to provide you with excellent care. Hearing back from our patients is one way we can continue to improve our services. Please take a few minutes to complete the written survey that you may receive in the mail after your visit with us. Thank you!             Your Updated Medication List - Protect others around you: Learn how to safely use, store and throw away your medicines at www.disposemymeds.org.          This list is accurate as of: 6/15/17  5:21 PM.  Always use your most recent med list.                   Brand Name Dispense Instructions for use    fluticasone 50 MCG/ACT spray    FLONASE    1 Bottle    Spray 1-2 sprays into both nostrils daily       hydroquinone 4 % Crea     30 g    Apply thin layer BID x 4-6 months then d/c       LORazepam 0.5 MG tablet    ATIVAN    20 tablet    Take 1 tablet (0.5 mg) by mouth as needed for anxiety       vitamin D 2000 UNITS tablet      Take 1 tablet by mouth daily

## 2017-06-20 ENCOUNTER — TRANSFERRED RECORDS (OUTPATIENT)
Dept: HEALTH INFORMATION MANAGEMENT | Facility: CLINIC | Age: 47
End: 2017-06-20

## 2017-06-23 ENCOUNTER — OFFICE VISIT (OUTPATIENT)
Dept: DERMATOLOGY | Facility: CLINIC | Age: 47
End: 2017-06-23
Payer: COMMERCIAL

## 2017-06-23 VITALS — HEART RATE: 67 BPM | DIASTOLIC BLOOD PRESSURE: 78 MMHG | SYSTOLIC BLOOD PRESSURE: 119 MMHG | OXYGEN SATURATION: 98 %

## 2017-06-23 DIAGNOSIS — I78.1 SPIDER VEINS: ICD-10-CM

## 2017-06-23 DIAGNOSIS — D48.5 NEOPLASM OF UNCERTAIN BEHAVIOR OF SKIN: Primary | ICD-10-CM

## 2017-06-23 DIAGNOSIS — L81.1 MELASMA: ICD-10-CM

## 2017-06-23 PROCEDURE — 88305 TISSUE EXAM BY PATHOLOGIST: CPT | Performed by: PHYSICIAN ASSISTANT

## 2017-06-23 PROCEDURE — 99213 OFFICE O/P EST LOW 20 MIN: CPT | Mod: 25 | Performed by: PHYSICIAN ASSISTANT

## 2017-06-23 PROCEDURE — 11100 HC BIOPSY SKIN/SUBQ/MUC MEM, SINGLE LESION: CPT | Performed by: PHYSICIAN ASSISTANT

## 2017-06-23 NOTE — PROGRESS NOTES
HPI:   Billie Tavarez is a 47 year old female who presents for recheck of melasma, discussed spider veins  chief complaint  Location: face, legs   Condition present for:  Years.   Previous treatments include: using hydroquinone for the face - spots have gotten much lighter and she is pleased with progress.     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 Just got a divorce; X will be moving out this weekend.     PHYSICAL EXAM:      Skin exam performed as follows: Type 1 skin. Mood appropriate  Alert and Oriented X 3. Well developed, well nourished in no distress.  General appearance: Normal  Head including face: Normal  Eyes: conjunctiva and lids: Normal  Mouth: Lips, teeth, gums: Normal  Neck: Normal  Chest-breast/axillae: Normal  Back: Normal  Spleen and liver: Normal  Cardiovascular: Exam of peripheral vascular system by observation for swelling, varicosities, edema: Normal  Genitalia: groin, buttocks: Normal  Extremities: digits/nails (clubbing): Normal  Eccrine and Apocrine glands: Normal  Right upper extremity: Normal  Left upper extremity: Normal  Right lower extremity: Normal  Left lower extremity: Normal  Skin: Scalp and body hair: See below    1. 3 mm tan papule on right nasolabial fold    ASSESSMENT/PLAN:     1. Dermal nevus r/o atypia on right nasolabial fold. Discussed shave removal vs excision - advised she will have a scar after area heals. She would like to proceed with removal despite this. Shave bx in typical fashion .  Area cleaned with betadyne and anesthetized with 1% lidocaine with epi .  Dermablade used to remove the lesion and sent to pathology. Bleeding was cauterized. Pt tolerated procedure  well.  2. Melasma on face much improved after HQ 4%. Is being very diligent with SPF. Advised can repeat HQ when needed.  3. Spider veins on legs; mostly on the thighs. Referred to [facility].         Follow-up: yearly FSE/PRN sooner  CC:   Scribed By: Janae Kinney, MS, PA-C

## 2017-06-23 NOTE — MR AVS SNAPSHOT
After Visit Summary   6/23/2017    Billie Tavarez    MRN: [id number]           Patient Information     Date Of Birth          1970        Visit Information        Provider Department      6/23/2017 9:30 AM Janae Kinney PA-C [facility]        Today's Diagnoses     Neoplasm of uncertain behavior of skin    -  1      Care Instructions    Wound Care Instructions     FOR SUPERFICIAL WOUNDS     [facility] [phone]                       AFTER 24 HOURS YOU SHOULD REMOVE THE BANDAGE AND BEGIN DAILY DRESSING CHANGES AS FOLLOWS:     1) Remove Dressing.     2) Clean and dry the area with tap water using a Q-tip or sterile gauze pad.     3) Apply Vaseline, Aquaphor, Polysporin ointment or Bacitracin ointment over entire wound.  Do NOT use Neosporin ointment.     4) Cover the wound with a band-aid, or a sterile non-stick gauze pad and micropore paper tape      REPEAT THESE INSTRUCTIONS AT LEAST ONCE A DAY UNTIL THE WOUND HAS COMPLETELY HEALED.    It is an old wives tale that a wound heals better when it is exposed to air and allowed to dry out. The wound will heal faster with a better cosmetic result if it is kept moist with ointment and covered with a bandage.    **Do not let the wound dry out.**      Supplies Needed:      *Cotton tipped applicators (Q-tips)    *Polysporin Ointment or Bacitracin Ointment (NOT NEOSPORIN)    *Band-aids or non-stick gauze pads and micropore paper tape.      PATIENT INFORMATION:    During the healing process you will notice a number of changes. All wounds develop a small halo of redness surrounding the wound.  This means healing is occurring. Severe itching with extensive redness usually indicates sensitivity to the ointment or bandage tape used to dress the wound.  You should call our office if this develops.      Swelling  and/or discoloration around your surgical site is common, particularly when performed around the  eye.    All wounds normally drain.  The larger the wound the more drainage there will be.  After 7-10 days, you will notice the wound beginning to shrink and new skin will begin to grow.  The wound is healed when you can see skin has formed over the entire area.  A healed wound has a healthy, shiny look to the surface and is red to dark pink in color to normalize.  Wounds may take approximately 4-6 weeks to heal.  Larger wounds may take 6-8 weeks.  After the wound is healed you may discontinue dressing changes.    You may experience a sensation of tightness as your wound heals. This is normal and will gradually subside.    Your healed wound may be sensitive to temperature changes. This sensitivity improves with time, but if you re having a lot of discomfort, try to avoid temperature extremes.    Patients frequently experience itching after their wound appears to have healed because of the continue healing under the skin.  Plain Vaseline will help relieve the itching.        POSSIBLE COMPLICATIONS    BLEEDIN. Leave the bandage in place.  2. Use tightly rolled up gauze or a cloth to apply direct pressure over the bandage for 30  minutes.  3. Reapply pressure for an additional 30 minutes if necessary  4. Use additional gauze and tape to maintain pressure once the bleeding has stopped.            Follow-ups after your visit        Who to contact     If you have questions or need follow up information about today's clinic visit or your schedule please contact [facility] directly at [phone].  Normal or non-critical lab and imaging results will be communicated to you by [employer], letter or phone within 4 business days after the clinic has received the results. If you do not hear from us within 7 days, please contact the clinic through [employer] or phone. If you have a critical or abnormal lab result, we will notify you by phone as soon as possible.  Submit refill requests through [employer]  or call your pharmacy and they will forward the refill request to us. Please allow 3 business days for your refill to be completed.          Additional Information About Your Visit        MyChart Information     [employer] gives you secure access to your electronic health record. If you see a primary care provider, you can also send messages to your care team and make appointments. If you have questions, please call your primary care clinic.  If you do not have a primary care provider, please call [phone] and they will assist you.        Care EveryWhere ID     This is your Care EveryWhere ID. This could be used by other organizations to access your [facility] medical records  [id number]        Your Vitals Were     Pulse Pulse Oximetry                67 98%           Blood Pressure from Last 3 Encounters:   06/23/17 119/78   06/15/17 120/75   06/06/17 108/54    Weight from Last 3 Encounters:   06/15/17 76.2 kg (168 lb)   06/06/17 76.7 kg (169 lb)   05/31/17 77.4 kg (170 lb 11.2 oz)              We Performed the Following     BIOPSY SKIN/SUBQ/MUC MEM, SINGLE LESION     Surgical pathology exam        Primary Care Provider Office Phone # Fax #    Davey Arteaga PA-C [phone]       [facility] [address] [facility] [facility] [address]        Equal Access to Services     SHANDRA ELIAS AH: Hadii aad ku hadasho Soomaali, waaxda luqadaha, qaybta kaalmada adeegyada, waxay idiin hayaan adeguero kharash lajody . So [facility] [phone].    ATENCIÓN: Si habla español, tiene a heard disposición servicios gratuitos de asistencia lingüística. Llame al [phone].    We comply with applicable federal civil rights laws and [place] laws. We do not discriminate on the basis of race, color, national origin, age, disability sex, sexual orientation or gender identity.            Thank you!     Thank you for choosing [facility]  for your care. Our goal is always to provide you with  excellent care. Hearing back from our patients is one way we can continue to improve our services. Please take a few minutes to complete the written survey that you may receive in the mail after your visit with us. Thank you!             Your Updated Medication List - Protect others around you: Learn how to safely use, store and throw away your medicines at www.disposemymeds.org.          This list is accurate as of: 6/23/17 10:06 AM.  Always use your most recent med list.                   Brand Name Dispense Instructions for use Diagnosis    fluticasone 50 MCG/ACT spray    FLONASE    1 Bottle    Spray 1-2 sprays into both nostrils daily    Atopic rhinitis       hydroquinone 4 % Crea     30 g    Apply thin layer BID x 4-6 months then d/c    Melasma       LORazepam 0.5 MG tablet    ATIVAN    20 tablet    Take 1 tablet (0.5 mg) by mouth as needed for anxiety    SHON (generalized anxiety disorder)       vitamin D 2000 UNITS tablet      Take 1 tablet by mouth daily

## 2017-06-23 NOTE — NURSING NOTE
"Initial /78  Pulse 67  SpO2 98% Estimated body mass index is 28.84 kg/(m^2) as calculated from the following:    Height as of 6/6/17: 1.626 m (5' 4\").    Weight as of 6/15/17: 76.2 kg (168 lb). .      "

## 2017-06-23 NOTE — PATIENT INSTRUCTIONS
Wound Care Instructions     FOR SUPERFICIAL WOUNDS     [facility] [phone]                       AFTER 24 HOURS YOU SHOULD REMOVE THE BANDAGE AND BEGIN DAILY DRESSING CHANGES AS FOLLOWS:     1) Remove Dressing.     2) Clean and dry the area with tap water using a Q-tip or sterile gauze pad.     3) Apply Vaseline, Aquaphor, Polysporin ointment or Bacitracin ointment over entire wound.  Do NOT use Neosporin ointment.     4) Cover the wound with a band-aid, or a sterile non-stick gauze pad and micropore paper tape      REPEAT THESE INSTRUCTIONS AT LEAST ONCE A DAY UNTIL THE WOUND HAS COMPLETELY HEALED.    It is an old wives tale that a wound heals better when it is exposed to air and allowed to dry out. The wound will heal faster with a better cosmetic result if it is kept moist with ointment and covered with a bandage.    **Do not let the wound dry out.**      Supplies Needed:      *Cotton tipped applicators (Q-tips)    *Polysporin Ointment or Bacitracin Ointment (NOT NEOSPORIN)    *Band-aids or non-stick gauze pads and micropore paper tape.      PATIENT INFORMATION:    During the healing process you will notice a number of changes. All wounds develop a small halo of redness surrounding the wound.  This means healing is occurring. Severe itching with extensive redness usually indicates sensitivity to the ointment or bandage tape used to dress the wound.  You should call our office if this develops.      Swelling  and/or discoloration around your surgical site is common, particularly when performed around the eye.    All wounds normally drain.  The larger the wound the more drainage there will be.  After 7-10 days, you will notice the wound beginning to shrink and new skin will begin to grow.  The wound is healed when you can see skin has formed over the entire area.  A healed wound has a healthy, shiny look to the surface and is red to dark pink in color to normalize.  Wounds may take approximately  4-6 weeks to heal.  Larger wounds may take 6-8 weeks.  After the wound is healed you may discontinue dressing changes.    You may experience a sensation of tightness as your wound heals. This is normal and will gradually subside.    Your healed wound may be sensitive to temperature changes. This sensitivity improves with time, but if you re having a lot of discomfort, try to avoid temperature extremes.    Patients frequently experience itching after their wound appears to have healed because of the continue healing under the skin.  Plain Vaseline will help relieve the itching.        POSSIBLE COMPLICATIONS    BLEEDIN. Leave the bandage in place.  2. Use tightly rolled up gauze or a cloth to apply direct pressure over the bandage for 30  minutes.  3. Reapply pressure for an additional 30 minutes if necessary  4. Use additional gauze and tape to maintain pressure once the bleeding has stopped.

## 2017-06-26 ENCOUNTER — APPOINTMENT (OUTPATIENT)
Dept: VASCULAR SURGERY | Facility: CLINIC | Age: 47
End: 2017-06-26
Payer: COMMERCIAL

## 2017-06-26 LAB — COPATH REPORT: NORMAL

## 2017-06-26 PROCEDURE — 99207 ZZC VEINSOLUTIONS FREE SCREENING: CPT | Performed by: SURGERY

## 2017-07-13 ENCOUNTER — TRANSFERRED RECORDS (OUTPATIENT)
Dept: HEALTH INFORMATION MANAGEMENT | Facility: CLINIC | Age: 47
End: 2017-07-13

## 2017-07-13 ENCOUNTER — OFFICE VISIT (OUTPATIENT)
Dept: OBGYN | Facility: CLINIC | Age: 47
End: 2017-07-13
Payer: COMMERCIAL

## 2017-07-13 DIAGNOSIS — N90.7 VULVAR CYST: Primary | ICD-10-CM

## 2017-07-13 PROCEDURE — 99213 OFFICE O/P EST LOW 20 MIN: CPT | Performed by: OBSTETRICS & GYNECOLOGY

## 2017-07-13 NOTE — MR AVS SNAPSHOT
After Visit Summary   7/13/2017    Billie Tavarez    MRN: [id number]           Patient Information     Date Of Birth          1970        Visit Information        Provider Department      7/13/2017 3:45 PM Merline Wellington MD [facility]        Today's Diagnoses     Vulvar cyst    -  1       Follow-ups after your visit        Who to contact     If you have questions or need follow up information about today's clinic visit or your schedule please contact [facility] directly at [phone].  Normal or non-critical lab and imaging results will be communicated to you by MyChart, letter or phone within 4 business days after the clinic has received the results. If you do not hear from us within 7 days, please contact the clinic through [employer] or phone. If you have a critical or abnormal lab result, we will notify you by phone as soon as possible.  Submit refill requests through [employer]save or call your pharmacy and they will forward the refill request to us. Please allow 3 business days for your refill to be completed.          Additional Information About Your Visit        MyChart Information     [employer] gives you secure access to your electronic health record. If you see a primary care provider, you can also send messages to your care team and make appointments. If you have questions, please call your primary care clinic.  If you do not have a primary care provider, please call [phone] and they will assist you.        Care EveryWhere ID     This is your Care EveryWhere ID. This could be used by other organizations to access your [place] medical records  [id number]         Blood Pressure from Last 3 Encounters:   06/23/17 119/78   06/15/17 120/75   06/06/17 108/54    Weight from Last 3 Encounters:   06/15/17 168 lb (76.2 kg)   06/06/17 169 lb (76.7 kg)   05/31/17 170 lb 11.2 oz (77.4 kg)              Today, you had the following     No orders found for display        Primary Care Provider Office Phone # Fax #    Davey Arteaga PA-C [phone]       [facility] [address] [address]  [facility] [address]        Equal Access to Services     SHANDRA ELIAS : Hadii aad ku hadjohno Soomaali, waaxda luqadaha, qaybta kaalmada adeegyada, waxay idiin hayzacharyn adeguero lloyd laTaiwomeron minaya. So [facility] [phone].    ATENCIÓN: Si habla español, tiene a heard disposición servicios gratuitos de asistencia lingüística. Llame al [phone].    We comply with applicable federal civil rights laws and [place] laws. We do not discriminate on the basis of race, color, national origin, age, disability sex, sexual orientation or gender identity.            Thank you!     Thank you for choosing [facility]  for your care. Our goal is always to provide you with excellent care. Hearing back from our patients is one way we can continue to improve our services. Please take a few minutes to complete the written survey that you may receive in the mail after your visit with us. Thank you!             Your Updated Medication List - Protect others around you: Learn how to safely use, store and throw away your medicines at www.disposemymeds.org.          This list is accurate as of: 7/13/17  5:34 PM.  Always use your most recent med list.                   Brand Name Dispense Instructions for use Diagnosis    fluticasone 50 MCG/ACT spray    FLONASE    1 Bottle    Spray 1-2 sprays into both nostrils daily    Atopic rhinitis       hydroquinone 4 % Crea     30 g    Apply thin layer BID x 4-6 months then d/c    Melasma       LORazepam 0.5 MG tablet    ATIVAN    20 tablet    Take 1 tablet (0.5 mg) by mouth as needed for anxiety    SHON (generalized anxiety disorder)       vitamin D 2000 UNITS tablet      Take 1 tablet by mouth daily

## 2017-07-13 NOTE — PROGRESS NOTES
Billie Tavarez is a 47 year old  who presents to assess vulvar cyst.  A small sebaceous cyst was I&D'd 6/15/17.  She tolerated the office procedure, but had some discomfort.  She can still feel a small lump in the same area, uncomfortable when she palpates it.  The lump feels smaller than prior to drainage.  No fevers, no drainage from the mass.     OBJECTIVE:  Pulse 74 /82    Pelvic exam:  External genitalia examined.  A tiny cystic mass is noted in the inner left labium, in the area of the prior I&D.  The mass appears smooth, is non-tender, not fixed.  No ulceration.  Urethral meatus, urethra, bladder, perineum, and anus appeared otherwise normal.     ASSESSMENT:  Labial sebaceous cyst.     PLAN: Discussed option of continued observation, vs surgical excision.  If excision is indicated, she wishes in-OR procedure with sedation, given the degree of discomfort she felt with the I&D.  Discussed surgical risks/benefits.  She will monitor, call prn.      Please note greater than 50% of this 15 minute appointment were spent in counseling with the patient of the issues described above in the history of present illness and in the plan, including evaluation and management of vulvar cysts.

## 2017-08-09 ENCOUNTER — MYC MEDICAL ADVICE (OUTPATIENT)
Dept: FAMILY MEDICINE | Facility: CLINIC | Age: 47
End: 2017-08-09

## 2017-08-09 NOTE — TELEPHONE ENCOUNTER
Patient is advised to make an appointment-either an e visit or phone visit, or optional clinic visit.  Rosario Bellamy, EARL  Triage Nurse

## 2017-08-28 ENCOUNTER — TELEPHONE (OUTPATIENT)
Dept: OBGYN | Facility: CLINIC | Age: 47
End: 2017-08-28

## 2017-08-28 NOTE — TELEPHONE ENCOUNTER
Patient calling because she would like to go ahead with surgery. Please order so Fozia can call patient. Thanks.   Dawn Villalobos, RN-BSN

## 2017-08-31 ENCOUNTER — OFFICE VISIT (OUTPATIENT)
Dept: OTOLARYNGOLOGY | Facility: CLINIC | Age: 47
End: 2017-08-31
Payer: COMMERCIAL

## 2017-08-31 DIAGNOSIS — R09.81 NASAL CONGESTION: Primary | ICD-10-CM

## 2017-08-31 PROCEDURE — 99202 OFFICE O/P NEW SF 15 MIN: CPT | Performed by: OTOLARYNGOLOGY

## 2017-08-31 ASSESSMENT — PAIN SCALES - GENERAL: PAINLEVEL: NO PAIN (0)

## 2017-08-31 NOTE — NURSING NOTE
"Billie Tavarez's goals for this visit include:   Chief Complaint   Patient presents with     Consult     deviated septum, drainage, turbinate issues       She requests these members of her care team be copied on today's visit information: Davey Arteaga      PCP: Davey Arteaga    Referring Provider:  Referred Self, MD  No address on file    Chief Complaint   Patient presents with     Consult     deviated septum, drainage, turbinate issues       Initial There were no vitals taken for this visit. Estimated body mass index is 28.84 kg/(m^2) as calculated from the following:    Height as of 6/6/17: 1.626 m (5' 4\").    Weight as of 6/15/17: 76.2 kg (168 lb).  Medication Reconciliation: complete    Do you need any medication refills at today's visit? No    Carolynn Lal LPN      "

## 2017-08-31 NOTE — PATIENT INSTRUCTIONS
Please contact our clinic if you have questions or if problems arise:    [place] office: [phone]  [facility] office: [phone]    If it is an urgent matter when the clinic is closed, please contact the [facility]  [phone] and ask to have Dr. Fito Hinds, or the ENT resident on-call paged. If it is a serious matter requiring immediate attention, please call 911 or go to your nearest emergency department.
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
## 2017-08-31 NOTE — MR AVS SNAPSHOT
After Visit Summary   8/31/2017    Billie Tavarez    MRN: [id number]           Patient Information     Date Of Birth          1970        Visit Information        Provider Department      8/31/2017 8:00 AM Fito Hinds MD [facility]        Today's Diagnoses     Nasal congestion    -  1       Follow-ups after your visit        Your next 10 appointments already scheduled     Sep 20, 2017  8:00 AM CDT   New Visit with Celso Tristan MD   [facility]    [address] [place] [id number] [phone]              Who to contact     If you have questions or need follow up information about today's clinic visit or your schedule please contact [facility] directly at [phone].  Normal or non-critical lab and imaging results will be communicated to you by [employer], letter or phone within 4 business days after the clinic has received the results. If you do not hear from us within 7 days, please contact the clinic through [employer] or phone. If you have a critical or abnormal lab result, we will notify you by phone as soon as possible.  Submit refill requests through [employer] or call your pharmacy and they will forward the refill request to us. Please allow 3 business days for your refill to be completed.          Additional Information About Your Visit        MyChart Information     [employer] gives you secure access to your electronic health record. If you see a primary care provider, you can also send messages to your care team and make appointments. If you have questions, please call your primary care clinic.  If you do not have a primary care provider, please call [phone] and they will assist you.      [employer] is an electronic gateway that provides easy, online access to your medical records. With [employer], you can request a clinic appointment, read your test results, renew a prescription  or communicate with your care team.     To access your existing account, please contact your [facility] or call [phone] for assistance.        Care EveryWhere ID     This is your Care EveryWhere ID. This could be used by other organizations to access your [facility] medical records  [id number]         Blood Pressure from Last 3 Encounters:   06/23/17 119/78   06/15/17 120/75   06/06/17 108/54    Weight from Last 3 Encounters:   06/15/17 76.2 kg (168 lb)   06/06/17 76.7 kg (169 lb)   05/31/17 77.4 kg (170 lb 11.2 oz)              Today, you had the following     No orders found for display       Primary Care Provider Office Phone # Fax #    Davey Arteaga PA-C [phone]       [address] [address] [facility] [address]        Equal Access to Services     [facility] : Hadii aad ku hadasho Soomaali, waaxda luqadaha, qaybta kaalmada adeegyada, waxay idiin hayaan adeguero abdi . So [facility] [phone].    ATENCIÓN: Si habla español, tiene a heard disposición servicios gratuitos de asistencia lingüística. Llame al [phone].    We comply with applicable federal civil rights laws and [place] laws. We do not discriminate on the basis of race, color, national origin, age, disability sex, sexual orientation or gender identity.            Thank you!     Thank you for choosing [facility]  for your care. Our goal is always to provide you with excellent care. Hearing back from our patients is one way we can continue to improve our services. Please take a few minutes to complete the written survey that you may receive in the mail after your visit with us. Thank you!             Your Updated Medication List - Protect others around you: Learn how to safely use, store and throw away your medicines at www.disposemymeds.org.          This list is accurate as of: 8/31/17  8:47 AM.  Always use your most recent med list.                   Brand Name Dispense  Instructions for use Diagnosis    fluticasone 50 MCG/ACT spray    FLONASE    1 Bottle    Spray 1-2 sprays into both nostrils daily    Atopic rhinitis       hydroquinone 4 % Crea     30 g    Apply thin layer BID x 4-6 months then d/c    Melasma       LORazepam 0.5 MG tablet    ATIVAN    20 tablet    Take 1 tablet (0.5 mg) by mouth as needed for anxiety    SHON (generalized anxiety disorder)       vitamin D 2000 UNITS tablet      Take 1 tablet by mouth daily

## 2017-09-04 ENCOUNTER — HOSPITAL ENCOUNTER (EMERGENCY)
Facility: CLINIC | Age: 47
Discharge: HOME | End: 2017-09-04
Attending: EMERGENCY MEDICINE | Admitting: EMERGENCY MEDICINE
Payer: COMMERCIAL

## 2017-09-04 ENCOUNTER — APPOINTMENT (OUTPATIENT)
Dept: GENERAL RADIOLOGY | Facility: CLINIC | Age: 47
End: 2017-09-04
Attending: EMERGENCY MEDICINE
Payer: COMMERCIAL

## 2017-09-04 VITALS
TEMPERATURE: 98.2 F | OXYGEN SATURATION: 100 % | DIASTOLIC BLOOD PRESSURE: 76 MMHG | RESPIRATION RATE: 16 BRPM | SYSTOLIC BLOOD PRESSURE: 127 MMHG

## 2017-09-04 DIAGNOSIS — S80.12XA CONTUSION OF LEG, LEFT, INITIAL ENCOUNTER: ICD-10-CM

## 2017-09-04 PROCEDURE — 25000132 ZZH RX MED GY IP 250 OP 250 PS 637: Performed by: EMERGENCY MEDICINE

## 2017-09-04 PROCEDURE — 73590 X-RAY EXAM OF LOWER LEG: CPT | Mod: LT

## 2017-09-04 PROCEDURE — 99283 EMERGENCY DEPT VISIT LOW MDM: CPT

## 2017-09-04 RX ORDER — IBUPROFEN 600 MG/1
600 TABLET, FILM COATED ORAL ONCE
Status: COMPLETED | OUTPATIENT
Start: 2017-09-04 | End: 2017-09-04

## 2017-09-04 RX ADMIN — IBUPROFEN 600 MG: 600 TABLET ORAL at 11:48

## 2017-09-04 ASSESSMENT — ENCOUNTER SYMPTOMS
ARTHRALGIAS: 1
NUMBNESS: 0
WEAKNESS: 0

## 2017-09-04 NOTE — ED PROVIDER NOTES
History     Chief Complaint:  Ankle Pain    HPI   Billie Tavarez is a 47 year old female who presents with left ankle pain. The patient reports she was kick boxing this morning and was doing high knees when she came down and rolled her left ankle. She was initially able to bear weight but pain got progressively worse over the next 15 minutes.  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 The patient reports she has mild pain in her left ankle when she walks but denies any significant pain at rest. She denies any numbness, weakness, or any other injuries.     Allergies:  Sulfa     Medications:    Lorazepam  Cholecalciferol     Past Medical History:    Anxiety  IBS  Rheumatoid arthritis  Rosacea  GERD    Past Surgical History:    Arthroplasty hip  Lasik  Tonsillectomy  Tubal ligation    Family History:    Diabetes  Hypertension  CAD    Social History:  Smoking status: Former smoker  Alcohol use: Socially   Marital Status:   [2]     Review of Systems   Musculoskeletal: Positive for arthralgias. Negative for gait problem.   Neurological: Negative for weakness and numbness.   All other systems reviewed and are negative.      Physical Exam     Patient Vitals for the past 24 hrs:   BP Temp Temp src Heart Rate Resp SpO2   09/04/17 1056 127/76 98.2  F (36.8  C) Oral 82 16 100 %     Physical Exam  General: Patient is alert and interactive when I enter the room  Head:  The scalp, face, and head appear normal  Eyes:  Conjunctivae are normal  ENT:    The nose is normal    Pinnae are normal    External acoustic canals are normal  Neck:  Trachea midline  CV:  Pulses are normal.   Resp:  No respiratory distress   Skin:  No rash or lesions noted  Neuro:  Speech is normal and fluent. Face is symmetric.     Moving all extremities well.   Psych: Awake. Alert.  Normal affect.  Appropriate interactions  Musc: Tenderness on the lateral aspect of the fibula. Compartments soft.  No bruising. Normal range of motion in the foot, ankle, and knee.     Emergency Department Course   Imaging:  Radiographic findings were communicated with the patient who voiced understanding of the findings.    X-ray Tibia and Fibula left, 2 views:  Normal  As read by Radiology.    Interventions:  Ibuprofen 600 mg oral     Emergency Department Course:  Past medical records, nursing notes, and vitals reviewed.  1143: I performed an exam of the patient and obtained history, as documented above.  The patient was sent for a tibial and fibula xray while in the emergency department, findings above.    1303: I rechecked the patient. Explained findings to the patient.  Patient ambulatory without difficulty.   I rechecked the patient. Findings and plan explained to the Patient. Patient discharged home with instructions regarding supportive care, medications, and reasons to return. The importance of close follow-up was reviewed.     Impression & Plan      Medical Decision Making:  Billie Tavarez is a 47 year old female presents for evaluation of ankle pain after kickboxing.  Signs and symptoms are consistent with a contusion.  A broad differential was considered including sprain, strain, fracture, tendon rupture, nerve impingement/compromise, referred pain. Supportive outpatient management is indicated.  Rest, ice, and elevation treatment was discussed with the patient. The patients head to toe trauma exam is otherwise negative for serious underlying disease of the head, neck, chest, abdomen, extremities, pelvis.  Close follow-up with patient's primary care physician per discharge precautions. Contusion discharge instructions given for home.     Diagnosis:    ICD-10-CM   1. Contusion of leg, left, initial encounter S80.12XA     Disposition: Discharged to home    Jessica Headley  9/4/2017   [facility] EMERGENCY DEPARTMENT    IJessica am serving as a scribe at 11:43 AM on 9/4/2017 to document services  personally performed by Pravin Olmos MD based on my observations and the provider's statements to me.        Pravin Olmos MD  09/04/17 6370

## 2017-09-04 NOTE — ED NOTES
Pt had injury to left outer ankle this AM while doing kickboxing.  Pt has numbness of outer aspect of ankle.

## 2017-09-04 NOTE — ED AVS SNAPSHOT
[facility] Emergency Department    [address]    [facility] [address]    Phone:  [phone]    Fax:  [phone]                                       Billie Tavarez   MRN: [id number]    Department:  [facility] Emergency Department   Date of Visit:  9/4/2017           After Visit Summary Signature Page     I have received my discharge instructions, and my questions have been answered. I have discussed any challenges I see with this plan with the nurse or doctor.    ..........................................................................................................................................  Patient/Patient Representative Signature      ..........................................................................................................................................  Patient Representative Print Name and Relationship to Patient    ..................................................               ................................................  Date                                            Time    ..........................................................................................................................................  Reviewed by Signature/Title    ...................................................              ..............................................  Date                                                            Time

## 2017-09-04 NOTE — DISCHARGE INSTRUCTIONS
Lower Extremity Contusion  You have a contusion (bruise) of a lower extremity (leg, knee, ankle, foot, or toe). Symptoms include pain, swelling, and skin discoloration. No bones are broken. This injury may take from a few days to a few weeks to heal.  During that time, the bruise may change from reddish in color, to purple-blue, to green-yellow, to yellow-brown.  Home care    Unless another medicine was prescribed, you can take acetaminophen, ibuprofen, or naproxen to control pain. (If you have chronic liver or kidney disease or ever had a stomach ulcer or gastrointestinal bleeding, talk with your doctor before using these medicines.)    Elevate the injured area to reduce pain and swelling. As much as possible, sit or lie down with the injured area raised about the level of your heart. This is especially important during the first 48 hours.    Ice the injured area to help reduce pain and swelling. Wrap a cold source (ice pack or ice cubes in a plastic bag) in a thin towel. Apply to the bruised area for 20 minutes every 1 to 2 hours the first day. Continue this 3 to 4 times a day until the pain and swelling goes away.    If crutches have been advised, do not bear full weight on the injured leg until you can do so without pain. You may return to sports when you are able to put full weight and impact on the injured leg without pain.  Follow up  Follow up with your healthcare provider or our staff as advised. Call if you are not improving within the next 1 to 2 weeks.  When to seek medical advice   Call your healthcare provider right away if any of these occur:    Increased pain or swelling    Foot or toes become cold, blue, numb or tingly    Signs of infection: Warmth, drainage, or increased redness or pain around the injury    Inability to move the injured area     Frequent bruising for unknown reasons  Date Last Reviewed: 2/1/2017 2000-2017 [employer] [address] [place] [address], [place] [address].  All rights reserved. This information is not intended as a substitute for professional medical care. Always follow your healthcare professional's instructions.

## 2017-09-04 NOTE — ED AVS SNAPSHOT
[facility] Emergency Department    [address] [place] [address]    Phone:  [phone]    Fax:  [phone]                                       Billie Tavarez   MRN: [id number]    Department:  [facility] Emergency Department   Date of Visit:  9/4/2017           Patient Information     Date Of Birth          1970        Your diagnoses for this visit were:     Contusion of leg, left, initial encounter        You were seen by Pravin Olmos MD.      Follow-up Information     Follow up with Davey Arteaga PA-C.    Specialty:  Physician Assistant    Why:  If symptoms worsen    Contact information:    [address] [place] [address]  [phone]          Discharge Instructions         Lower Extremity Contusion  You have a contusion (bruise) of a lower extremity (leg, knee, ankle, foot, or toe). Symptoms include pain, swelling, and skin discoloration. No bones are broken. This injury may take from a few days to a few weeks to heal.  During that time, the bruise may change from reddish in color, to purple-blue, to green-yellow, to yellow-brown.  Home care    Unless another medicine was prescribed, you can take acetaminophen, ibuprofen, or naproxen to control pain. (If you have chronic liver or kidney disease or ever had a stomach ulcer or gastrointestinal bleeding, talk with your doctor before using these medicines.)    Elevate the injured area to reduce pain and swelling. As much as possible, sit or lie down with the injured area raised about the level of your heart. This is especially important during the first 48 hours.    Ice the injured area to help reduce pain and swelling. Wrap a cold source (ice pack or ice cubes in a plastic bag) in a thin towel. Apply to the bruised area for 20 minutes every 1 to 2 hours the first day. Continue this 3 to 4 times a day until the pain and swelling goes away.    If crutches have been advised, do not  bear full weight on the injured leg until you can do so without pain. You may return to sports when you are able to put full weight and impact on the injured leg without pain.  Follow up  Follow up with your healthcare provider or our staff as advised. Call if you are not improving within the next 1 to 2 weeks.  When to seek medical advice   Call your healthcare provider right away if any of these occur:    Increased pain or swelling    Foot or toes become cold, blue, numb or tingly    Signs of infection: Warmth, drainage, or increased redness or pain around the injury    Inability to move the injured area     Frequent bruising for unknown reasons  Date Last Reviewed: 2/1/2017 2000-2017 [employer]. [address] [place] [address], [place] [address]. All rights reserved. This information is not intended as a substitute for professional medical care. Always follow your healthcare professional's instructions.          Future Appointments        Provider Department Dept Phone Center    9/20/2017 8:00 AM Celso Tristan MD [facility] [phone] [place]      24 Hour Appointment Hotline       To make an appointment at any [facility], call [phone] ([phone]). If you don't have a family doctor or clinic, we will help you find one. [facility] are conveniently located to serve the needs of you and your family.             Review of your medicines      Our records show that you are taking the medicines listed below. If these are incorrect, please call your family doctor or clinic.        Dose / Directions Last dose taken    fluticasone 50 MCG/ACT spray   Commonly known as:  FLONASE   Dose:  1-2 spray   Quantity:  1 Bottle        Spray 1-2 sprays into both nostrils daily   Refills:  1        hydroquinone 4 % Crea   Quantity:  30 g        Apply thin layer BID x 4-6 months then d/c   Refills:  5        LORazepam 0.5 MG tablet   Commonly known as:  ATIVAN   Dose:   0.5 mg   Quantity:  20 tablet        Take 1 tablet (0.5 mg) by mouth as needed for anxiety   Refills:  0        vitamin D 2000 UNITS tablet   Dose:  1 tablet        Take 1 tablet by mouth daily   Refills:  0                Procedures and tests performed during your visit     XR Tibia & Fibula Left 2 Views      Orders Needing Specimen Collection     None      Pending Results     Date and Time Order Name Status Description    9/4/2017 1147 XR Tibia & Fibula Left 2 Views In process             Pending Culture Results     No orders found from 9/2/2017 to 9/5/2017.            Pending Results Instructions     If you had any lab results that were not finalized at the time of your Discharge, you can call the ED Lab Result RN at [phone]. You will be contacted by this team for any positive Lab results or changes in treatment. The nurses are available 7 days a week from 10A to 6:30P.  You can leave a message 24 hours per day and they will return your call.        Test Results From Your Hospital Stay        9/4/2017 12:47 PM      Result not yet available     Exam Ended                Clinical Quality Measure: Blood Pressure Screening     Your blood pressure was checked while you were in the emergency department today. The last reading we obtained was  BP: 127/76 . Please read the guidelines below about what these numbers mean and what you should do about them.  If your systolic blood pressure (the top number) is less than 120 and your diastolic blood pressure (the bottom number) is less than 80, then your blood pressure is normal. There is nothing more that you need to do about it.  If your systolic blood pressure (the top number) is 120-139 or your diastolic blood pressure (the bottom number) is 80-89, your blood pressure may be higher than it should be. You should have your blood pressure rechecked within a year by a primary care provider.  If your systolic blood pressure (the top number) is 140 or greater or your  diastolic blood pressure (the bottom number) is 90 or greater, you may have high blood pressure. High blood pressure is treatable, but if left untreated over time it can put you at risk for heart attack, stroke, or kidney failure. You should have your blood pressure rechecked by a primary care provider within the next 4 weeks.  If your provider in the emergency department today gave you specific instructions to follow-up with your doctor or provider even sooner than that, you should follow that instruction and not wait for up to 4 weeks for your follow-up visit.        Thank you for choosing [facility]       Thank you for choosing [facility] for your care. Our goal is always to provide you with excellent care. Hearing back from our patients is one way we can continue to improve our services. Please take a few minutes to complete the written survey that you may receive in the mail after you visit with us. Thank you!        [employer] Information     [employer] gives you secure access to your electronic health record. If you see a primary care provider, you can also send messages to your care team and make appointments. If you have questions, please call your primary care clinic.  If you do not have a primary care provider, please call [phone] and they will assist you.        Care EveryWhere ID     This is your Care EveryWhere ID. This could be used by other organizations to access your [facility] medical records  [id number]        Equal Access to Services     SHANDRA ELIAS : Tahir Alicia, waaxda luqadaha, qaybta kaalmada adeegyada, ashanti minaya. So [facility] [phone].    ATENCIÓN: Si habla español, tiene a heard disposición servicios gratuitos de asistencia lingüística. Llame al [phone].    We comply with applicable federal civil rights laws and [place] laws. We do not discriminate on the basis of race, color, national origin, age, disability sex, sexual orientation or  gender identity.            After Visit Summary       This is your record. Keep this with you and show to your community pharmacist(s) and doctor(s) at your next visit.

## 2017-09-11 ENCOUNTER — OFFICE VISIT (OUTPATIENT)
Dept: FAMILY MEDICINE | Facility: CLINIC | Age: 47
End: 2017-09-11
Payer: COMMERCIAL

## 2017-09-11 VITALS
SYSTOLIC BLOOD PRESSURE: 116 MMHG | OXYGEN SATURATION: 100 % | TEMPERATURE: 97.9 F | WEIGHT: 162.4 LBS | RESPIRATION RATE: 16 BRPM | HEART RATE: 65 BPM | BODY MASS INDEX: 27.88 KG/M2 | DIASTOLIC BLOOD PRESSURE: 70 MMHG

## 2017-09-11 DIAGNOSIS — Z11.3 SCREEN FOR STD (SEXUALLY TRANSMITTED DISEASE): Primary | ICD-10-CM

## 2017-09-11 DIAGNOSIS — N91.2 AMENORRHEA: ICD-10-CM

## 2017-09-11 DIAGNOSIS — F41.1 GAD (GENERALIZED ANXIETY DISORDER): ICD-10-CM

## 2017-09-11 LAB
BETA HCG QUAL IFA URINE: NEGATIVE
SPECIMEN SOURCE: NORMAL
WET PREP SPEC: NORMAL

## 2017-09-11 PROCEDURE — 87389 HIV-1 AG W/HIV-1&-2 AB AG IA: CPT | Performed by: NURSE PRACTITIONER

## 2017-09-11 PROCEDURE — 86780 TREPONEMA PALLIDUM: CPT | Performed by: NURSE PRACTITIONER

## 2017-09-11 PROCEDURE — 99213 OFFICE O/P EST LOW 20 MIN: CPT | Performed by: NURSE PRACTITIONER

## 2017-09-11 PROCEDURE — 84703 CHORIONIC GONADOTROPIN ASSAY: CPT | Performed by: NURSE PRACTITIONER

## 2017-09-11 PROCEDURE — 87591 N.GONORRHOEAE DNA AMP PROB: CPT | Performed by: NURSE PRACTITIONER

## 2017-09-11 PROCEDURE — 87210 SMEAR WET MOUNT SALINE/INK: CPT | Performed by: NURSE PRACTITIONER

## 2017-09-11 PROCEDURE — 36415 COLL VENOUS BLD VENIPUNCTURE: CPT | Performed by: NURSE PRACTITIONER

## 2017-09-11 PROCEDURE — 87491 CHLMYD TRACH DNA AMP PROBE: CPT | Performed by: NURSE PRACTITIONER

## 2017-09-11 RX ORDER — LORAZEPAM 0.5 MG/1
0.5 TABLET ORAL PRN
Qty: 10 TABLET | Refills: 0 | Status: SHIPPED | OUTPATIENT
Start: 2017-09-11 | End: 2018-03-08

## 2017-09-11 NOTE — MR AVS SNAPSHOT
After Visit Summary   9/11/2017    Billie Tavarez    MRN: [id number]           Patient Information     Date Of Birth          1970        Visit Information        Provider Department      9/11/2017 8:40 AM Lynda Manzo Ra, APRN CNP [facility]        Today's Diagnoses     Screen for STD (sexually transmitted disease)    -  1    Amenorrhea        Mild major depression (H)        SHON (generalized anxiety disorder)          Care Instructions    I will let you know the results of your labs.  See Alejandro within the next few months for a medication recheck.          Follow-ups after your visit        Your next 10 appointments already scheduled     Sep 20, 2017  8:00 AM CDT   New Visit with Celso Tristan MD   [facility]    [address] [place] [address] [phone]            Oct 25, 2017   Procedure with Merline Wellington MD   [facility], Same Day Surgery (--)    [address] [place] [address] [phone]              Who to contact     If you have questions or need follow up information about today's clinic visit or your schedule please contact [facility] directly at [phone].  Normal or non-critical lab and imaging results will be communicated to you by [employer], letter or phone within 4 business days after the clinic has received the results. If you do not hear from us within 7 days, please contact the clinic through [employer] or phone. If you have a critical or abnormal lab result, we will notify you by phone as soon as possible.  Submit refill requests through [employer] or call your pharmacy and they will forward the refill request to us. Please allow 3 business days for your refill to be completed.          Additional Information About Your Visit        MyChart Information     [employer] gives you secure access to your electronic health record. If you see a primary care provider,  you can also send messages to your care team and make appointments. If you have questions, please call your primary care clinic.  If you do not have a primary care provider, please call [phone] and they will assist you.        Care EveryWhere ID     This is your Care EveryWhere ID. This could be used by other organizations to access your [facility] medical records  [id number]        Your Vitals Were     Pulse Temperature Respirations Last Period Pulse Oximetry Breastfeeding?    65 97.9  F (36.6  C) (Oral) 16 09/08/2017 (Exact Date) 100% No    BMI (Body Mass Index)                   27.88 kg/m2            Blood Pressure from Last 3 Encounters:   09/11/17 116/70   09/04/17 127/76   06/23/17 119/78    Weight from Last 3 Encounters:   09/11/17 162 lb 6.4 oz (73.7 kg)   06/15/17 168 lb (76.2 kg)   06/06/17 169 lb (76.7 kg)              We Performed the Following     Anti Treponema     Beta HCG qual IFA urine     Chlamydia trachomatis PCR     DEPRESSION ACTION PLAN (DAP)     HIV Antigen Antibody Combo     Neisseria gonorrhoeae PCR     Wet prep          Where to get your medicines      Some of these will need a paper prescription and others can be bought over the counter.  Ask your nurse if you have questions.     Bring a paper prescription for each of these medications     LORazepam 0.5 MG tablet          Primary Care Provider Office Phone # Fax #    Davey Denver Arteaga PA-C [phone]       [address] [facility] [address]        Equal Access to Services     [facility] CURT : Hadii aad ku hadasho Soomaali, waaxda luqadaha, qaybta kaalmada adeegyarobel, ashanti abdi . So [facility] [phone].    ATENCIÓN: Si habla español, tiene a heard disposición servicios gratuitos de asistencia lingüística. Llame al [phone].    We comply with applicable federal civil rights laws and [place] laws. We do not discriminate on the basis of race, color, national origin, age, disability sex,  sexual orientation or gender identity.            Thank you!     Thank you for choosing [facility]  for your care. Our goal is always to provide you with excellent care. Hearing back from our patients is one way we can continue to improve our services. Please take a few minutes to complete the written survey that you may receive in the mail after your visit with us. Thank you!             Your Updated Medication List - Protect others around you: Learn how to safely use, store and throw away your medicines at www.disposemymeds.org.          This list is accurate as of: 9/11/17  9:17 AM.  Always use your most recent med list.                   Brand Name Dispense Instructions for use Diagnosis    fluticasone 50 MCG/ACT spray    FLONASE    1 Bottle    Spray 1-2 sprays into both nostrils daily    Atopic rhinitis       hydroquinone 4 % Crea     30 g    Apply thin layer BID x 4-6 months then d/c    Melasma       LORazepam 0.5 MG tablet    ATIVAN    10 tablet    Take 1 tablet (0.5 mg) by mouth as needed for anxiety    SHON (generalized anxiety disorder)       vitamin D 2000 UNITS tablet      Take 1 tablet by mouth daily

## 2017-09-11 NOTE — NURSING NOTE
"Chief Complaint   Patient presents with     Lab Only       Initial /70 (BP Location: Right arm, Patient Position: Chair, Cuff Size: Adult Regular)  Pulse 65  Temp 97.9  F (36.6  C) (Oral)  Resp 16  Wt 162 lb 6.4 oz (73.7 kg)  LMP 09/08/2017 (Exact Date)  SpO2 100%  Breastfeeding? No  BMI 27.88 kg/m2 Estimated body mass index is 27.88 kg/(m^2) as calculated from the following:    Height as of 6/6/17: 5' 4\" (1.626 m).    Weight as of this encounter: 162 lb 6.4 oz (73.7 kg).  Medication Reconciliation: complete   Nita Zhong MA      "

## 2017-09-11 NOTE — LETTER
My Depression Action Plan  Name: Billie Tavarez   Date of Birth 1970  Date: 9/11/2017    My doctor: Davey Arteaga   My clinic: [facility]  [address] [facility] [address] [phone]          GREEN    ZONE   Good Control    What it looks like:     Things are going generally well. You have normal up s and down s. You may even feel depressed from time to time, but bad moods usually last less than a day.   What you need to do:  1. Continue to care for yourself (see self care plan)  2. Check your depression survival kit and update it as needed  3. Follow your physician s recommendations including any medication.  4. Do not stop taking medication unless you consult with your physician first.           YELLOW         ZONE Getting Worse    What it looks like:     Depression is starting to interfere with your life.     It may be hard to get out of bed; you may be starting to isolate yourself from others.    Symptoms of depression are starting to last most all day and this has happened for several days.     You may have suicidal thoughts but they are not constant.   What you need to do:     1. Call your care team, your response to treatment will improve if you keep your care team informed of your progress. Yellow periods are signs an adjustment may need to be made.     2. Continue your self-care, even if you have to fake it!    3. Talk to someone in your support network    4. Open up your depression survival kit           RED    ZONE Medical Alert - Get Help    What it looks like:     Depression is seriously interfering with your life.     You may experience these or other symptoms: You can t get out of bed most days, can t work or engage in other necessary activities, you have trouble taking care of basic hygiene, or basic responsibilities, thoughts of suicide or death that will not go away, self-injurious behavior.     What you need to do:  1. Call your care team  and request a same-day appointment. If they are not available (weekends or after hours) call your local crisis line, emergency room or 911.      Electronically signed by: Nita Zhong, September 11, 2017    Depression Self Care Plan / Survival Kit    Self-Care for Depression  Here s the deal. Your body and mind are really not as separate as most people think.  What you do and think affects how you feel and how you feel influences what you do and think. This means if you do things that people who feel good do, it will help you feel better.  Sometimes this is all it takes.  There is also a place for medication and therapy depending on how severe your depression is, so be sure to consult with your medical provider and/ or Behavioral Health Consultant if your symptoms are worsening or not improving.     In order to better manage my stress, I will:    Exercise  Get some form of exercise, every day. This will help reduce pain and release endorphins, the  feel good  chemicals in your brain. This is almost as good as taking antidepressants!  This is not the same as joining a gym and then never going! (they count on that by the way ) It can be as simple as just going for a walk or doing some gardening, anything that will get you moving.      Hygiene   Maintain good hygiene (Get out of bed in the morning, Make your bed, Brush your teeth, Take a shower, and Get dressed like you were going to work, even if you are unemployed).  If your clothes don't fit try to get ones that do.    Diet  I will strive to eat foods that are good for me, drink plenty of water, and avoid excessive sugar, caffeine, alcohol, and other mood-altering substances.  Some foods that are helpful in depression are: complex carbohydrates, B vitamins, flaxseed, fish or fish oil, fresh fruits and vegetables.    Psychotherapy  I agree to participate in Individual Therapy (if recommended).    Medication  If prescribed medications, I agree to take them.   Missing doses can result in serious side effects.  I understand that drinking alcohol, or other illicit drug use, may cause potential side effects.  I will not stop my medication abruptly without first discussing it with my provider.    Staying Connected With Others  I will stay in touch with my friends, family members, and my primary care provider/team.    Use your imagination  Be creative.  We all have a creative side; it doesn t matter if it s oil painting, sand castles, or mud pies! This will also kick up the endorphins.    Witness Beauty  (AKA stop and smell the roses) Take a look outside, even in mid-winter. Notice colors, textures. Watch the squirrels and birds.     Service to others  Be of service to others.  There is always someone else in need.  By helping others we can  get out of ourselves  and remember the really important things.  This also provides opportunities for practicing all the other parts of the program.    Humor  Laugh and be silly!  Adjust your TV habits for less news and crime-drama and more comedy.    Control your stress  Try breathing deep, massage therapy, biofeedback, and meditation. Find time to relax each day.     My support system    Clinic Contact:  Phone number:    Contact 1:  Phone number:    Contact 2:  Phone number:    Judaism/:  Phone number:    Therapist:  Phone number:    Local crisis center:    Phone number:    Other community support:  Phone number:

## 2017-09-11 NOTE — PATIENT INSTRUCTIONS
I will let you know the results of your labs.  See Alejandro within the next few months for a medication recheck.

## 2017-09-11 NOTE — PROGRESS NOTES
SUBJECTIVE:   Billie Tavarez is a 47 year old female who presents to clinic today for the following health issues:      Pt here for std screening    Pt had recent encounter where condom broke.  Not a partner whom she is usually intimate with.  Requests std screening today.  She feels externally her vagina may feel slightly different, but nothing internal.  No discharge.    She would also like a refill of her ativan.  Takes occasionally, never more than 1/2 to 1 tablet daily.  She uses for intermittent anxiety.  Continues with counseling.  Never has been interested in daily med.    Problem list and histories reviewed & adjusted, as indicated.  Additional history: as documented    Patient Active Problem List   Diagnosis     Mild major depression (H)     CARDIOVASCULAR SCREENING; LDL GOAL LESS THAN 160     GERD (gastroesophageal reflux disease)     Atopic rhinitis     Family history of diabetes mellitus     Family history of coronary artery disease     Elevated rheumatoid factor     Obesity     Anxiety     Diarrhea     Colon polyp     Rash     Rosacea     Inflamed seborrheic keratosis     Dyschromia     Vitamin D deficiency     Past Surgical History:   Procedure Laterality Date     ARTHROPLASTY HIP      R     ENHANCE LASER REFRACTIVE EXISTING PT IN PARAMETERS Right 2/20/2017    Procedure: ENHANCE LASER REFRACTIVE EXISTING PT IN PARAMETERS;  Surgeon: Leandro Martinez MD;  Location: [facility]     LASIK Left 1/18/2016    Procedure: LASIK;  Surgeon: Leandro Martinez MD;  Location: [facility]     LASIK CUSTOMVUE Right 1/9/2017    Procedure: LASIK CUSTOMVUE;  Surgeon: Leandro Martinez MD;  Location: [facility]     TONSILLECTOMY       TUBAL LIGATION       WAVESCAN SCREENING Left 1/18/2016    Procedure: WAVESCAN SCREENING;  Surgeon: Leandro Martinez MD;  Location: [facility]     WAVESCAN SCREENING Right 1/9/2017    Procedure: WAVESCAN SCREENING;  Surgeon: Leandro Martinez MD;  Location: [facility]     WAVESCAN SCREENING Right 2/20/2017     Procedure: WAVESCAN SCREENING;  Surgeon: Leandro Martinez MD;  Location: [facility]       Social History   Substance Use Topics     Smoking status: Former Smoker     Packs/day: 0.50     Years: 6.00     Types: Cigarettes     Quit date: 3/3/1995     Smokeless tobacco: Never Used      Comment: quit 1994     Alcohol use 2.0 oz/week     4 Standard drinks or equivalent per week      Comment: socially     Family History   Problem Relation Age of Onset     DIABETES Mother      Hypertension Mother      C.A.D. Mother      2 stents     Arthritis Maternal Grandmother      osteo     C.A.D. Paternal Grandfather 47     MENTAL ILLNESS Daughter      Mood Disorder     C.A.D. Paternal Uncle              Reviewed and updated as needed this visit by clinical staffTobacco  Med Hx  Surg Hx  Fam Hx  Soc Hx      Reviewed and updated as needed this visit by Provider         ROS:  SEE HPI.    OBJECTIVE:     /70 (BP Location: Right arm, Patient Position: Chair, Cuff Size: Adult Regular)  Pulse 65  Temp 97.9  F (36.6  C) (Oral)  Resp 16  Wt 162 lb 6.4 oz (73.7 kg)  LMP 09/08/2017 (Exact Date)  SpO2 100%  Breastfeeding? No  BMI 27.88 kg/m2  Body mass index is 27.88 kg/(m^2).  GENERAL: healthy, alert and no distress  RESP: lungs clear to auscultation - no rales, rhonchi or wheezes  CV: regular rate and rhythm, normal S1 S2, no S3 or S4, no murmur, click or rub, no peripheral edema and peripheral pulses strong   (female): normal female external genitalia  PSYCH: mentation appears normal, affect normal/bright    Diagnostic Test Results:  Results for orders placed or performed in visit on 09/11/17 (from the past 24 hour(s))   Beta HCG qual IFA urine   Result Value Ref Range    Beta HCG Qual IFA Urine Negative NEG^Negative      Wet prep   Result Value Ref Range    Specimen Description Vagina     Wet Prep No yeast seen     Wet Prep No clue cells seen     Wet Prep No Trichomonas seen        ASSESSMENT/PLAN:   1. Screen for STD (sexually  transmitted disease)  No known exposure.  Testing today.  Pt agrees with plan and verbalized understanding.  - Neisseria gonorrhoeae PCR  - Chlamydia trachomatis PCR  - Wet prep  - Anti Treponema  - HIV Antigen Antibody Combo    2. SHON (generalized anxiety disorder)  Pt reports stable on current regimen.  Needs to see PCP for follow up.  Small refill given until she schedules with PCP.  Reviewed no drinking, driving, or big decisions.  Pt agrees with plan and verbalized understanding.  - LORazepam (ATIVAN) 0.5 MG tablet; Take 1 tablet (0.5 mg) by mouth as needed for anxiety  Dispense: 10 tablet; Refill: 0    3. Amenorrhea  Recently restarted birth control, period has been a bit off.  UPT negative, monitor.  - Beta HCG qual IFA urine; Future  - Beta HCG qual IFA urine    KIMBERLY [facility]

## 2017-09-12 ENCOUNTER — TELEPHONE (OUTPATIENT)
Dept: OBGYN | Facility: CLINIC | Age: 47
End: 2017-09-12

## 2017-09-12 LAB
C TRACH DNA SPEC QL NAA+PROBE: NEGATIVE
HIV 1+2 AB+HIV1 P24 AG SERPL QL IA: NONREACTIVE
N GONORRHOEA DNA SPEC QL NAA+PROBE: NEGATIVE
SPECIMEN SOURCE: NORMAL
SPECIMEN SOURCE: NORMAL
T PALLIDUM IGG+IGM SER QL: NEGATIVE

## 2017-09-12 NOTE — TELEPHONE ENCOUNTER
TC to patient. Discussed message below. Scheduled with midwife tomorrow as pt is leaving at 5am on Thurs morning and no Ob/gyn providers available.   Dawn Villalobos, RN-BSN

## 2017-09-12 NOTE — TELEPHONE ENCOUNTER
It's really hard to tell without seeing her.   Can she see me Thurs am at [place]? Or someone from our clinic tomorrow?

## 2017-09-12 NOTE — TELEPHONE ENCOUNTER
Patient calling to get your recommendation. Went and saw her primary for itching, burning, discharge. No odor. Thought she might have a yeast infection. All tests came back negative (in Epic). Pt is going on vacation on Thurs and is wondering what she can do to help with the uncomfortableness she is experiencing. Still having itching and burning that comes and goes. Pharmacy is teed up. Please advise. Thanks.   Dawn Villalobos, RN-BSN

## 2017-09-13 ENCOUNTER — OFFICE VISIT (OUTPATIENT)
Dept: MIDWIFE SERVICES | Facility: CLINIC | Age: 47
End: 2017-09-13
Payer: COMMERCIAL

## 2017-09-13 DIAGNOSIS — N94.9 VAGINAL DISCOMFORT: ICD-10-CM

## 2017-09-13 DIAGNOSIS — B37.31 CANDIDIASIS OF VULVA AND VAGINA: Primary | ICD-10-CM

## 2017-09-13 LAB
SPECIMEN SOURCE: ABNORMAL
WET PREP SPEC: ABNORMAL

## 2017-09-13 PROCEDURE — 87210 SMEAR WET MOUNT SALINE/INK: CPT | Performed by: ADVANCED PRACTICE MIDWIFE

## 2017-09-13 PROCEDURE — 99213 OFFICE O/P EST LOW 20 MIN: CPT | Performed by: ADVANCED PRACTICE MIDWIFE

## 2017-09-13 RX ORDER — FLUCONAZOLE 150 MG/1
150 TABLET ORAL
Qty: 4 TABLET | Refills: 0 | Status: SHIPPED | OUTPATIENT
Start: 2017-09-13 | End: 2017-09-20

## 2017-09-13 NOTE — MR AVS SNAPSHOT
After Visit Summary   9/13/2017    Billie Tavarez    MRN: [id number]           Patient Information     Date Of Birth          1970        Visit Information        Provider Department      9/13/2017 3:45 PM [facility]        Today's Diagnoses     Candidiasis of vulva and vagina    -  1    Vaginal discomfort           Follow-ups after your visit        Your next 10 appointments already scheduled     Sep 20, 2017  8:00 AM CDT   New Visit with Celso Tristan MD   [facility] ([facility])    [address] [place] [address] [id number] [phone]            Oct 25, 2017   Procedure with Merline Wellington MD   [facility], [place], Same Day Surgery (--)    [address] [place] [id number] [phone]              Who to contact     If you have questions or need follow up information about today's clinic visit or your schedule please contact [facility] directly at [phone].  Normal or non-critical lab and imaging results will be communicated to you by Yoziohart, letter or phone within 4 business days after the clinic has received the results. If you do not hear from us within 7 days, please contact the clinic through Voice123t or phone. If you have a critical or abnormal lab result, we will notify you by phone as soon as possible.  Submit refill requests through [email or web address] or call your pharmacy and they will forward the refill request to us. Please allow 3 business days for your refill to be completed.          Additional Information About Your Visit        MyChart Information     [email or web address] gives you secure access to your electronic health record. If you see a primary care provider, you can also send messages to your care team and make appointments. If you have questions, please call your primary care clinic.  If you do not have a primary care provider, please call [phone] and they will  assist you.        Care EveryWhere ID     This is your Care EveryWhere ID. This could be used by other organizations to access your [facility] medical records  [id number]        Your Vitals Were     Last Period                   09/08/2017 (Exact Date)            Blood Pressure from Last 3 Encounters:   09/11/17 116/70   09/04/17 127/76   06/23/17 119/78    Weight from Last 3 Encounters:   09/11/17 162 lb 6.4 oz (73.7 kg)   06/15/17 168 lb (76.2 kg)   06/06/17 169 lb (76.7 kg)              We Performed the Following     Wet prep          Today's Medication Changes          These changes are accurate as of: 9/13/17  4:43 PM.  If you have any questions, ask your nurse or doctor.               Start taking these medicines.        Dose/Directions    fluconazole 150 MG tablet   Commonly known as:  DIFLUCAN   Used for:  Candidiasis of vulva and vagina        Dose:  150 mg   Take 1 tablet (150 mg) by mouth every 3 days   Quantity:  4 tablet   Refills:  0            Where to get your medicines      These medications were sent to [facility] [address] [place] [id number]    Hours:  24-hours Phone:  [phone]     fluconazole 150 MG tablet                Primary Care Provider Office Phone # Fax #    Davey Arteaga PA-C [phone] [address] [facility] [address]        Equal Access to Services     SHANDRA ELIAS AH: Hadii cristina choio Sodemetris, waaxda luqadaha, qaybta kaalmada adeegyada, waxtrever dougie bonner adeguero minaya. So [facility] [phone].    ATENCIÓN: Si habla español, tiene a heard disposición servicios gratuitos de asistencia lingüística. Llame al [phone].    We comply with applicable federal civil rights laws and [place] laws. We do not discriminate on the basis of race, color, national origin, age, disability sex, sexual orientation or gender identity.            Thank you!      Thank you for choosing [facility]  for your care. Our goal is always to provide you with excellent care. Hearing back from our patients is one way we can continue to improve our services. Please take a few minutes to complete the written survey that you may receive in the mail after your visit with us. Thank you!             Your Updated Medication List - Protect others around you: Learn how to safely use, store and throw away your medicines at www.disposemymeds.org.          This list is accurate as of: 9/13/17  4:43 PM.  Always use your most recent med list.                   Brand Name Dispense Instructions for use Diagnosis    fluconazole 150 MG tablet    DIFLUCAN    4 tablet    Take 1 tablet (150 mg) by mouth every 3 days    Candidiasis of vulva and vagina       fluticasone 50 MCG/ACT spray    FLONASE    1 Bottle    Spray 1-2 sprays into both nostrils daily    Atopic rhinitis       hydroquinone 4 % Crea     30 g    Apply thin layer BID x 4-6 months then d/c    Melasma       LORazepam 0.5 MG tablet    ATIVAN    10 tablet    Take 1 tablet (0.5 mg) by mouth as needed for anxiety    SHON (generalized anxiety disorder)       vitamin D 2000 UNITS tablet      Take 1 tablet by mouth daily

## 2017-09-13 NOTE — PROGRESS NOTES
SUBJECTIVE:  Billie Tavarez is a 47 year old female presents with local irritation, vulvar itching, burning and pain for 2 days. Had a condom break on Monday and was tested for Full panel of STIs including wet prep and all results were negative. After this her burning and irritation persisted. Yesterday she used a monistat 1 day preparation. Which caused her to have severe pain and burning.     Contraception condoms    REVIEW OF SYSTEMS  C: NEGATIVE for fever, chills, change in weight  R: NEGATIVE for significant cough or SOB  CV: NEGATIVE for chest pain, palpitations or peripheral edema  GI: NEGATIVE for nausea, abdominal pain, heartburn, or change in bowel habits  : NEGATIVE for frequency, dysuria, or hematuria      General medical, surgical, OB/Gyn and social histories   reviewed and updated in Histories section of [facility].     OBJECTIVE:   EXAM  LMP 09/08/2017 (Exact Date)  Patient appears well.    Abdomen normal, soft without tenderness, guarding, mass or organomegaly.   No inguinal adenopathy or CVA tenderness.      PELVIC EXAM:  Vulva: BUS WNL, no lesions noted,   Vagina: Discharge brownish and white in copious amounts- likely from monistat last night, no lesions, well rugated, good tone,   Cervix: Smooth, pink, no visible lesions, neg CMT,   Uterus: Normal size and position, non-tender, mobile,   Ovaries: No masses palpable, non-tender, mobile,   Rectal exam: deferred    WET PREP:  yeast   GC/CHLAMYDIA CULTURE OBTAINED:PATIENT DECLINED    ASSESSMENT:   yeast.  (N94.9) Vaginal discomfort  (primary encounter diagnosis)  Comment:   Plan: Wet prep              PLAN:  Treatment plan per orders in [facility].   STD prevention discussed. Birth control needs reviewed.  Abstain from intercourse for duration of treatment.   Return if symptoms do not resolve as anticipated.  Given letter/ handout on healthy vaginal environment.      Silvana Armstrong, ESA APRN

## 2017-09-13 NOTE — NURSING NOTE
"Chief Complaint   Patient presents with     Vaginal Problem     vaginal discomfort       Initial LMP 2017 (Exact Date) Estimated body mass index is 27.88 kg/(m^2) as calculated from the following:    Height as of 17: 5' 4\" (1.626 m).    Weight as of 17: 162 lb 6.4 oz (73.7 kg).  BP completed using cuff size: large        The following HM Due: NONE      The following patient reported/Care Every where data was sent to:  P ABSTRACT QUALITY INITIATIVES [[id number]]      Ray Avila           "

## 2017-09-20 ENCOUNTER — OFFICE VISIT (OUTPATIENT)
Dept: OTOLARYNGOLOGY | Facility: CLINIC | Age: 47
End: 2017-09-20
Payer: COMMERCIAL

## 2017-09-20 ENCOUNTER — TELEPHONE (OUTPATIENT)
Dept: OTOLARYNGOLOGY | Facility: CLINIC | Age: 47
End: 2017-09-20

## 2017-09-20 VITALS
HEART RATE: 80 BPM | HEIGHT: 64 IN | WEIGHT: 162 LBS | DIASTOLIC BLOOD PRESSURE: 71 MMHG | BODY MASS INDEX: 27.66 KG/M2 | SYSTOLIC BLOOD PRESSURE: 117 MMHG

## 2017-09-20 DIAGNOSIS — R49.0 MUSCLE TENSION DYSPHONIA: Primary | ICD-10-CM

## 2017-09-20 DIAGNOSIS — J31.0 CHRONIC RHINITIS, UNSPECIFIED TYPE: ICD-10-CM

## 2017-09-20 DIAGNOSIS — R05.3 CHRONIC COUGH: ICD-10-CM

## 2017-09-20 PROCEDURE — 31575 DIAGNOSTIC LARYNGOSCOPY: CPT | Performed by: OTOLARYNGOLOGY

## 2017-09-20 PROCEDURE — 99213 OFFICE O/P EST LOW 20 MIN: CPT | Mod: 25 | Performed by: OTOLARYNGOLOGY

## 2017-09-20 NOTE — NURSING NOTE
"Billie Tavarez's goals for this visit include:   Chief Complaint   Patient presents with     Consult     Coughing, wonders about sinuses       She requests these members of her care team be copied on today's visit information: yes      PCP: Davey Arteaga    Referring Provider:  No referring provider defined for this encounter.    Chief Complaint   Patient presents with     Consult     Coughing, wonders about sinuses       Initial /71  Pulse 80  Ht 1.626 m (5' 4\")  Wt 73.5 kg (162 lb)  LMP 09/08/2017 (Exact Date)  BMI 27.81 kg/m2 Estimated body mass index is 27.81 kg/(m^2) as calculated from the following:    Height as of this encounter: 1.626 m (5' 4\").    Weight as of this encounter: 73.5 kg (162 lb).  Medication Reconciliation: complete   Arlet Goodman CMA (AAMA)      "

## 2017-09-20 NOTE — TELEPHONE ENCOUNTER
Called patient to tell her that she could get the [facility]ery done at [facility] as per Mar[facility].  Patient stated that she just wanted to know for sure that they did it; she isn't sure at this point if she will do it.  Provided phone number for scheduling.    Arlet Goodman CMA ([facility])

## 2017-09-20 NOTE — MR AVS SNAPSHOT
After Visit Summary   9/20/2017    Billie Tavarez    MRN: [id number]           Patient Information     Date Of Birth          1970        Visit Information        Provider Department      9/20/2017 8:00 AM Celso Tristan MD [facility]        Today's Diagnoses     Muscle tension dysphonia    -  1    Chronic cough          Care Instructions    Saline nasal spray 2-4 sprays each nostril PRN    GERD (Gastro Esophageal Reflux Disorder)  Other names    reflux     Laryngopharyngeal Reflux Disorder (LPRD)   Symptoms    dry, choking sensation, especially during the night     voice quality that is worst in the morning     raw, burning sensation in the throat     pain in throat, neck, or running from back of chin along neck     frequent coughing or desire to clear throat     rough, gritty voice quality     decline in voice quality or comfort with continued voice use     a sour taste in your mouth upon waking up       Interestingly, the majority of the patients in the [facility] who have laryngeal symptoms of GERD do not have any stomach discomfort or sensation of heartburn.   Cause  Acid from the stomach refluxes back up through the esophagus and spills over onto the larynx. This irritates the vocal folds (also called vocal cords; see the explanation of this terminology) and creates inflammation, which causes the vocal folds to vibrate unevenly. Coughing and throat clearing from the irritation can make the inflammation worse. The resulting voice disorder is often related to the poor vibratory quality of the inflamed vocal folds and the muscle tension created by effortful attempts at compensation.  Treatment  Anti-reflux medication and dietary precautions are the first line of defense. Functional voice therapy is useful to teach techniques for reducing effortful compensation and instruct the individual in improved vocal hygiene.  At the [facility], we do not  hand out a list of foods and beverages that must be avoided. Rather, we educate about types of foods and beverages known to cause reflux and encourage the individual to systematically investigate which foods stimulate their own reflux. Also, the individual is encouraged to manage reflux under the care of a Gastroenterologist (gastrointestinal specialist).  Interested readers are encouraged to look at the website of the [facility] at [facility] for a more in depth discussion of GERD and the voice (see our Links page).  Lifestyle changes that may help reduce symptoms of GERD/LPRD    eat smaller meals more frequently throughout the day, rather than three large meals     elevate the head of your bed 2-3 inches (don't just use extra pillows for your head)     avoid clothes that fit tightly around the waist     avoid lying down within 2-3 hours of eating (don't eat dinner late at night)   Types of foods known to trigger increased stomach acid    spicy food (such as chili or jalapeño peppers, Federico or Szechuan spices)     acidic foods such as tomato products or citrus products     greasy foods     caffeine     alcohol     carbonation     roughage, such as popcorn and peanuts, or raw vegetables     dairy products     strong mint such as peppermint candies     chocolate     Reading this list might make you think you can only eat bread and oatmeal for the rest of your life. Fortunately, most individuals are not triggered by everything on this list. For example, for every person who is lactose intolerant and has problems with dairy products, there may be someone else whose digestive system is calmed by milk. Also, in our experience, few persons are triggered by peppermints or chocolate. Therefore, we recommend that you experiment with your own dietary habits, changing one food class at a time. Also, if you have recently started taking anti-reflux medications, you may want to wait for several months,  to see how the medication works without any change in your dietary habits.                Follow-ups after your visit        Your next 10 appointments already scheduled     Oct 25, 2017   Procedure with Merline Wellington MD   [facility], [place], Same Day Surgery (--)    [address]  [place] [address] [phone]              Who to contact     If you have questions or need follow up information about today's clinic visit or your schedule please contact [facility] directly at [phone].  Normal or non-critical lab and imaging results will be communicated to you by Wenwohart, letter or phone within 4 business days after the clinic has received the results. If you do not hear from us within 7 days, please contact the clinic through [facility] [place] or phone. If you have a critical or abnormal lab result, we will notify you by phone as soon as possible.  Submit refill requests through Zayante or call your pharmacy and they will forward the refill request to us. Please allow 3 business days for your refill to be completed.          Additional Information About Your Visit        [employer] Information     Zayante gives you secure access to your electronic health record. If you see a primary care provider, you can also send messages to your care team and make appointments. If you have questions, please call your primary care clinic.  If you do not have a primary care provider, please call [phone] and they will assist you.      Zayante is an electronic gateway that provides easy, online access to your medical records. With Zayante, you can request a clinic appointment, read your test results, renew a prescription or communicate with your care team.     To access your existing account, please contact your [facility] or call [phone] for assistance.        Care EveryWhere ID     This is your Care EveryWhere ID. This could be used by other organizations to access your [place]  "medical records  [id number]        Your Vitals Were     Pulse Height Last Period BMI (Body Mass Index)          80 1.626 m (5' 4\") 09/08/2017 (Exact Date) 27.81 kg/m2         Blood Pressure from Last 3 Encounters:   09/20/17 117/71   09/11/17 116/70   09/04/17 127/76    Weight from Last 3 Encounters:   09/20/17 73.5 kg (162 lb)   09/11/17 73.7 kg (162 lb 6.4 oz)   06/15/17 76.2 kg (168 lb)              Today, you had the following     No orders found for display       Primary Care Provider Office Phone # Fax #    Davey rAteaga PA-C [phone] [address]  [facility] [address]        Equal Access to Services     [facility] CURT : Hadii cristina zelaya hadasho Soomaali, waaxda luqadaha, qaybta kaalmada adeegyada, ashanti abdi . So [facility] [phone].    ATENCIÓN: Si habla español, tiene a heard disposición servicios gratuitos de asistencia lingüística. Aaron al [phone].    We comply with applicable federal civil rights laws and [place] laws. We do not discriminate on the basis of race, color, national origin, age, disability sex, sexual orientation or gender identity.            Thank you!     Thank you for choosing [facility]  for your care. Our goal is always to provide you with excellent care. Hearing back from our patients is one way we can continue to improve our services. Please take a few minutes to complete the written survey that you may receive in the mail after your visit with us. Thank you!             Your Updated Medication List - Protect others around you: Learn how to safely use, store and throw away your medicines at www.disposemymeds.org.          This list is accurate as of: 9/20/17  8:45 AM.  Always use your most recent med list.                   Brand Name Dispense Instructions for use Diagnosis    fluticasone 50 MCG/ACT spray    FLONASE    1 Bottle    Spray 1-2 sprays into both nostrils daily    Atopic rhinitis       " hydroquinone 4 % Crea     30 g    Apply thin layer BID x 4-6 months then d/c    Melasma       LORazepam 0.5 MG tablet    ATIVAN    10 tablet    Take 1 tablet (0.5 mg) by mouth as needed for anxiety    SHON (generalized anxiety disorder)       vitamin D 2000 UNITS tablet      Take 1 tablet by mouth daily

## 2017-09-20 NOTE — PROGRESS NOTES
HISTORY OF PRESENT ILLNESS: Billie Tavarez is a 47 year old female with a history of chronic rhinitis, cough, and intermittent dysphonia. She notes a long history of postnasal drainage with throat clearing and cough. She did have negative allergy testing 10 years ago. She tried omeprazole 20 mg a day without benefit and then twice a day for a month without benefit approximate 3 years ago. She had an infection 1 year ago which treated with antibiotics and the postnasal drainage resolved for 2-3 weeks and then return. She did see Dr. Judd Bajwa in February 2 June 2017 he felt she had 10 hepatic membrane retractions or less septal deviation turbinate hypertrophy and mucosal edema. He also felt that she had mild vocal fold bowing she was treated with antibiotics and intranasal steroid spray. On follow-up a month later she was slightly improved continue to have a septal deviation sinus CT was obtained which showed a left septal deviation, turbinate hypertrophy, a small octavio bullosa and mild ethmoid sinusitis. She was treated with antibiotics and prednisone and guaifenesin. This did not improve her symptoms and allergy evaluation was recommended but she had been advised by other allergist that as she gets older allergies are less common and she did pursue this. She also notes that it is hard to talk and work. She will have phlegm which has to be cleared her voice will come and go but if there is excessive voice use is she'll have fatigue. This is continuing until today. She's trying diet modification without help. She notes that her symptoms are variable some days she will be fine and other days she'll be symptomatic. She did see Dr. Hinds felt her symptoms of her more related to rhinitis rather than an anatomic nasal defect.      PAST MEDICAL HISTORY:   Past Medical History:   Diagnosis Date     Anxiety      Gluten intolerance      IBS (irritable bowel syndrome)      Rheumatoid arthritis(714.0)     Abstracted  5/24/02.     Rosacea        PAST SURGICAL HISTORY:   Past Surgical History:   Procedure Laterality Date     ARTHROPLASTY HIP      R     ENHANCE LASER REFRACTIVE EXISTING PT IN PARAMETERS Right 2/20/2017    Procedure: ENHANCE LASER REFRACTIVE EXISTING PT IN PARAMETERS;  Surgeon: Leandro Martinez MD;  [facility]     LASIK Left 1/18/2016    Procedure: LASIK;  Surgeon: Leandro Martinez MD;  [facility]     LASIK CUSTOMVUE Right 1/9/2017    Procedure: LASIK CUSTOMVUE;  Surgeon: eLandro Martinez MD;  [facility]     TONSILLECTOMY       TUBAL LIGATION       WAVESCAN SCREENING Left 1/18/2016    Procedure: WAVESCAN SCREENING;  Surgeon: Leandro Martinez MD;  [facility]     WAVESCAN SCREENING Right 1/9/2017    Procedure: WAVESCAN SCREENING;  Surgeon: Leandro Martinez MD;  [facility]     WAVESCAN SCREENING Right 2/20/2017    Procedure: WAVESCAN SCREENING;  Surgeon: Leandro Martinez MD;  Location: [facility]       FAMILY HISTORY:   Family History   Problem Relation Age of Onset     DIABETES Mother      Hypertension Mother      C.A.D. Mother      2 stents     Arthritis Maternal Grandmother      osteo     C.A.D. Paternal Grandfather 47     MENTAL ILLNESS Daughter      Mood Disorder     C.A.D. Paternal Uncle        SOCIAL HISTORY:   Social History   Substance Use Topics     Smoking status: Former Smoker     Packs/day: 0.50     Years: 6.00     Types: Cigarettes     Quit date: 3/3/1995     Smokeless tobacco: Never Used      Comment: quit 1994     Alcohol use 2.0 oz/week     4 Standard drinks or equivalent per week      Comment: socially       REVIEW OF SYSTEMS: Ten point review of systems was performed and is negative except for what is noted in the HPI and PMH.     ALLERGIES: Sulfa drugs    MEDICATIONS:   Current Outpatient Prescriptions   Medication Sig Dispense Refill     fluconazole (DIFLUCAN) 150 MG tablet Take 1 tablet (150 mg) by mouth every 3 days 4 tablet 0     LORazepam (ATIVAN) 0.5 MG tablet Take 1  tablet (0.5 mg) by mouth as needed for anxiety 10 tablet 0     hydroquinone 4 % CREA Apply thin layer BID x 4-6 months then d/c (Patient not taking: Reported on 8/31/2017) 30 g 5     fluticasone (FLONASE) 50 MCG/ACT nasal spray Spray 1-2 sprays into both nostrils daily 1 Bottle 1     Cholecalciferol (VITAMIN D) 2000 UNITS tablet Take 1 tablet by mouth daily         PHYSICAL EXAMINATION:  She  is awake, alert and in no apparent distress.    Her tympanic membranes are clear and intact bilaterally.The right tympanitic membrane is mildly scarred. External auditory canals are clear.  Nasal exam shows a mild to moderate septal deviation to the right  with partial obstruction obstruction.  Examination of the oral cavity shows no suspicious lesions. The mucosa is dry. There is symmetric movement of the tongue and soft palate.    The oropharynx is clear.  Her neck is supple without significant adenopathy. There is tenderness of the thyrohyoid membrane on the left.  Pulse is regular.  Upper airway is clear.  Cranial nerves II-XII are grossly intact.       PROCEDURE: A flexible nasal and laryngeal endoscopy  was performed.  Informed consent was obtained and a time out was performed. Initial exam was performed without topical anesthesia. The fiberoptic scope was passed each nasal cavity. The nasal mucosa appeared dry. There was minimal to no secretions within the nasal cavity. There was a left septal deviation which was partially obstructing. In the area of the mid septum there was a right inferior spur. No septal perforation was seen. No intra-nasal masses were present. No excessive erythema was seen the nasopharynx was clear with some mild residual adenoid tissue which was not obstructing.    3% lidocaine and 0.25% phenylephrine was sprayed into the nasal cavity and allowed 3 to 5 minutes for effect. The scope was passed through the right sided nostril. Examination showed the vocal folds to be mobile and meet in the midline.   No nodules, polyps or ulcerations are seen.  There is minimal to no inflammation or erythema of the supraglottic or glottic larynx.  With phonation there is moderate to severe contraction of the supraglottic larynx.  The hypopharynx is otherwise clear as is the subglottis.       IMPRESSION/PLAN: Chronic rhinitis that has not responded to steroid nasal sprays or systemic steroids with a clear CT scan this year. She is not aware of any environmental irritants in her household she does have a cat and a dog, she is not allergic to them by testing. She notes she does work in a dry environment and uses her voice great detail. For this I'm recommending a saline nasal spray and to continue her steroid nasal spray. I offered her the possibility of a topical chemical cautery at the [facility] if this is ineffective for her and she will consider this. Another option would be a turbinate reduction.      I also believe she has a muscle tension dysphonia which results in vocal fold irritation and frequent throat clearing. I am recommending a trial of speech therapy to work on her muscle tension dysphonia as well as cough suppression. I will have her follow up with me after completion of therapy.

## 2017-09-21 ENCOUNTER — TELEPHONE (OUTPATIENT)
Dept: OBGYN | Facility: CLINIC | Age: 47
End: 2017-09-21

## 2017-09-21 DIAGNOSIS — Z30.41 ORAL CONTRACEPTIVE PILL SURVEILLANCE: ICD-10-CM

## 2017-09-21 DIAGNOSIS — Z30.09 GENERAL COUNSELING FOR PRESCRIPTION OF ORAL CONTRACEPTIVES: Primary | ICD-10-CM

## 2017-09-21 RX ORDER — LEVONORGESTREL/ETHIN.ESTRADIOL 0.1-0.02MG
1 TABLET ORAL DAILY
Qty: 84 TABLET | Refills: 3 | Status: SHIPPED | OUTPATIENT
Start: 2017-09-21 | End: 2017-11-02

## 2017-09-21 NOTE — TELEPHONE ENCOUNTER
Pt calling in stating that she would like to switch her patch to a pill. She states that she is going through her patches way too fast, because they are falling off, mostly when she does kick boxing. The pt stated that she has taken a pill in the past, but she is ok with whatever you think correlates to the patch. She did schedule an AE, as she was overdue for one, but due to her schedule and your schedule, we could not get her in until 11/29/2017. She would like to have something temporarily, due to the length of time she has to wait until her appt. Pharmacy is cued up. Please advise. sE Miner RN

## 2017-10-02 ENCOUNTER — OFFICE VISIT (OUTPATIENT)
Dept: FAMILY MEDICINE | Facility: CLINIC | Age: 47
End: 2017-10-02
Payer: COMMERCIAL

## 2017-10-02 VITALS
DIASTOLIC BLOOD PRESSURE: 64 MMHG | BODY MASS INDEX: 27.88 KG/M2 | HEIGHT: 64 IN | SYSTOLIC BLOOD PRESSURE: 104 MMHG | TEMPERATURE: 98.3 F | OXYGEN SATURATION: 100 % | WEIGHT: 163.3 LBS | HEART RATE: 85 BPM

## 2017-10-02 DIAGNOSIS — R39.9 SYMPTOMS INVOLVING URINARY SYSTEM: Primary | ICD-10-CM

## 2017-10-02 DIAGNOSIS — R35.0 URINARY FREQUENCY: ICD-10-CM

## 2017-10-02 DIAGNOSIS — R39.15 URINARY URGENCY: ICD-10-CM

## 2017-10-02 LAB
ALBUMIN UR-MCNC: NEGATIVE MG/DL
APPEARANCE UR: CLEAR
BILIRUB UR QL STRIP: NEGATIVE
COLOR UR AUTO: YELLOW
GLUCOSE UR STRIP-MCNC: NEGATIVE MG/DL
HGB UR QL STRIP: NEGATIVE
KETONES UR STRIP-MCNC: NEGATIVE MG/DL
LEUKOCYTE ESTERASE UR QL STRIP: NEGATIVE
NITRATE UR QL: NEGATIVE
PH UR STRIP: 5.5 PH (ref 5–7)
SOURCE: NORMAL
SP GR UR STRIP: 1.01 (ref 1–1.03)
UROBILINOGEN UR STRIP-ACNC: 0.2 EU/DL (ref 0.2–1)

## 2017-10-02 PROCEDURE — 87086 URINE CULTURE/COLONY COUNT: CPT | Performed by: PHYSICIAN ASSISTANT

## 2017-10-02 PROCEDURE — 81003 URINALYSIS AUTO W/O SCOPE: CPT | Performed by: PHYSICIAN ASSISTANT

## 2017-10-02 PROCEDURE — 99213 OFFICE O/P EST LOW 20 MIN: CPT | Performed by: PHYSICIAN ASSISTANT

## 2017-10-02 NOTE — NURSING NOTE
"Chief Complaint   Patient presents with     UTI       Initial /64 (BP Location: Right arm, Cuff Size: Adult Regular)  Pulse 85  Temp 98.3  F (36.8  C) (Oral)  Ht 5' 4\" (1.626 m)  Wt 163 lb 4.8 oz (74.1 kg)  LMP 09/08/2017 (Exact Date)  SpO2 100%  BMI 28.03 kg/m2 Estimated body mass index is 28.03 kg/(m^2) as calculated from the following:    Height as of this encounter: 5' 4\" (1.626 m).    Weight as of this encounter: 163 lb 4.8 oz (74.1 kg).  Medication Reconciliation: complete   Corey Manuel CMA      "

## 2017-10-02 NOTE — MR AVS SNAPSHOT
After Visit Summary   10/2/2017    Billie Tavarez    MRN: [id number]           Patient Information     Date Of Birth          1970        Visit Information        Provider Department      10/2/2017 1:20 PM Davey Arteaga PA-C [facility]        Today's Diagnoses     Symptoms involving urinary system    -  1    Urinary urgency        Urinary frequency           Follow-ups after your visit        Your next 10 appointments already scheduled     Oct 12, 2017  3:30 PM CDT   Voice Eval with Allison E Alpers, SLP   [facility] Speech Therapy ([facility])    [address]  [facility] [address]   [phone]            Nov 13, 2017   Procedure with Merline Wellington MD   [facility], [place], Same Day Surgery (--)    [address] [place] [id number] [phone]            Nov 29, 2017  8:00 AM CST   PHYSICAL with Sona Covarrubias MD   [facility]    [address] [place] [address] [address]  [facility] [id number] [phone]              Who to contact     If you have questions or need follow up information about today's clinic visit or your schedule please contact [facility] directly at [phone].  Normal or non-critical lab and imaging results will be communicated to you by MyChart, letter or phone within 4 business days after the clinic has received the results. If you do not hear from us within 7 days, please contact the clinic through MyChart or phone. If you have a critical or abnormal lab result, we will notify you by phone as soon as possible.  Submit refill requests through [employer] or call your pharmacy and they will forward the refill request to us. Please allow 3 business days for your refill to be completed.          Additional Information About Your Visit        [employer] Information     [employer] gives you secure access to your electronic health record. If you see a primary  "care provider, you can also send messages to your care team and make appointments. If you have questions, please call your primary care clinic.  If you do not have a primary care provider, please call [phone] and they will assist you.        Care EveryWhere ID     This is your Care EveryWhere ID. This could be used by other organizations to access your [place] medical records  [id number]        Your Vitals Were     Pulse Temperature Height Last Period Pulse Oximetry BMI (Body Mass Index)    85 98.3  F (36.8  C) (Oral) 5' 4\" (1.626 m) 09/08/2017 (Exact Date) 100% 28.03 kg/m2       Blood Pressure from Last 3 Encounters:   10/02/17 104/64   09/20/17 117/71   09/11/17 116/70    Weight from Last 3 Encounters:   10/02/17 163 lb 4.8 oz (74.1 kg)   09/20/17 162 lb (73.5 kg)   09/11/17 162 lb 6.4 oz (73.7 kg)              We Performed the Following     *UA reflex to Microscopic and Culture ([place] and [facility] (except [place] and [place])        Primary Care Provider Office Phone # Fax #    Davey Arteaga PA-C [phone] [address] [facility] [address]        Equal Access to Services     SHANDRA ELIAS : Hadii aad ku hadasho Soomaali, waaxda luqadaha, qaybta kaalmada adeegyada, waxay idiin hayaan edwardo khyolis abdi . So [facility] [phone].    ATENCIÓN: Si habla español, tiene a heard disposición servicios gratuitos de asistencia lingüística. Llame al [phone].    We comply with applicable federal civil rights laws and [place] laws. We do not discriminate on the basis of race, color, national origin, age, disability, sex, sexual orientation, or gender identity.            Thank you!     Thank you for choosing [facility]  for your care. Our goal is always to provide you with excellent care. Hearing back from our patients is one way we can continue to improve our services. Please take a few minutes to complete the written survey that you may receive in the " mail after your visit with us. Thank you!             Your Updated Medication List - Protect others around you: Learn how to safely use, store and throw away your medicines at www.disposemymeds.org.          This list is accurate as of: 10/2/17  1:50 PM.  Always use your most recent med list.                   Brand Name Dispense Instructions for use Diagnosis    fluticasone 50 MCG/ACT spray    FLONASE    1 Bottle    Spray 1-2 sprays into both nostrils daily    Atopic rhinitis       hydroquinone 4 % Crea     30 g    Apply thin layer BID x 4-6 months then d/c    Melasma       levonorgestrel-ethinyl estradiol 0.1-20 MG-MCG per tablet    AVIANE,ALESSE,LESSINA    84 tablet    Take 1 tablet by mouth daily    Oral contraceptive pill surveillance       LORazepam 0.5 MG tablet    ATIVAN    10 tablet    Take 1 tablet (0.5 mg) by mouth as needed for anxiety    SHON (generalized anxiety disorder)       vitamin D 2000 UNITS tablet      Take 1 tablet by mouth daily

## 2017-10-02 NOTE — PROGRESS NOTES
SUBJECTIVE:   Billie Tavarez is a 47 year old female who presents to clinic today for the following health issues:      URINARY TRACT SYMPTOMS      Duration: 1 day    Description  frequency, urgency, odor and spasm     Intensity:  mild    Accompanying signs and symptoms:  Fever/chills: no   Flank pain no   Nausea and vomiting: no   Vaginal symptoms: odor and burning  Abdominal/Pelvic Pain: no     History  History of frequent UTI's: no   History of kidney stones: no   Sexually Active: YES  Possibility of pregnancy: No    Precipitating or alleviating factors: None    Therapies tried and outcome: increase fluid intake   Outcome: ineffective     -Patient presents with a one day hx of urinary symptoms   -There is frequency, urgency and odor  -denies hematuria  -Sensing some spasms along the bladder  -no flank pain, no fevers/chills  -recent yeast infection, improved with diflucan      Problem list and histories reviewed & adjusted, as indicated.  Additional history: as documented    Patient Active Problem List   Diagnosis     Mild major depression (H)     CARDIOVASCULAR SCREENING; LDL GOAL LESS THAN 160     GERD (gastroesophageal reflux disease)     Atopic rhinitis     Family history of diabetes mellitus     Family history of coronary artery disease     Elevated rheumatoid factor     Obesity     Anxiety     Diarrhea     Colon polyp     Rash     Rosacea     Inflamed seborrheic keratosis     Dyschromia     Vitamin D deficiency     Past Surgical History:   Procedure Laterality Date     ARTHROPLASTY HIP      R     ENHANCE LASER REFRACTIVE EXISTING PT IN PARAMETERS Right 2/20/2017    Procedure: ENHANCE LASER REFRACTIVE EXISTING PT IN PARAMETERS;  Surgeon: Leandro Martinez MD;  Location: [facility]     LASIK Left 1/18/2016    Procedure: LASIK;  Surgeon: Leandro Martinez MD;  Location: [facility]     LASIK CUSTOMVUE Right 1/9/2017    Procedure: LASIK CUSTOMVUE;  Surgeon: Leandro Martinez MD;  Location: [facility]     TONSILLECTOMY    "    TUBAL LIGATION       WAVESCAN SCREENING Left 1/18/2016    Procedure: WAVESCAN SCREENING;  Surgeon: Leandro Martinez MD;  [facility]     WAVESCAN SCREENING Right 1/9/2017    Procedure: WAVESCAN SCREENING;  Surgeon: Leandro Martinez MD;  [facility]     WAVESCAN SCREENING Right 2/20/2017    Procedure: WAVESCAN SCREENING;  Surgeon: Leandro Martinez MD;  Location: [facility]       Social History   Substance Use Topics     Smoking status: Former Smoker     Packs/day: 0.50     Years: 6.00     Types: Cigarettes     Quit date: 3/3/1995     Smokeless tobacco: Never Used      Comment: quit 1994     Alcohol use 2.0 oz/week     4 Standard drinks or equivalent per week      Comment: socially     Family History   Problem Relation Age of Onset     DIABETES Mother      Hypertension Mother      C.A.D. Mother      2 stents     Arthritis Maternal Grandmother      osteo     C.A.D. Paternal Grandfather 47     MENTAL ILLNESS Daughter      Mood Disorder     C.A.D. Paternal Uncle              Reviewed and updated as needed this visit by clinical staffTobacco  Allergies  Med Hx  Surg Hx  Fam Hx  Soc Hx      Reviewed and updated as needed this visit by Provider         ROS:  Constitutional, HEENT, cardiovascular, pulmonary, gi and gu systems are negative, except as otherwise noted.      OBJECTIVE:   /64 (BP Location: Right arm, Cuff Size: Adult Regular)  Pulse 85  Temp 98.3  F (36.8  C) (Oral)  Ht 5' 4\" (1.626 m)  Wt 163 lb 4.8 oz (74.1 kg)  LMP 09/08/2017 (Exact Date)  SpO2 100%  BMI 28.03 kg/m2  Body mass index is 28.03 kg/(m^2).  GENERAL: healthy, alert and no distress  ABDOMEN: soft, nontender, no hepatosplenomegaly, no masses and bowel sounds normal  NEURO: mentation intact    Diagnostic Test Results:  Results for orders placed or performed in visit on 10/02/17 (from the past 24 hour(s))   *UA reflex to Microscopic and Culture ([place] [facility] (except [place] and [place])   Result Value Ref " Range    Color Urine Yellow     Appearance Urine Clear     Glucose Urine Negative NEG^Negative mg/dL    Bilirubin Urine Negative NEG^Negative    Ketones Urine Negative NEG^Negative mg/dL    Specific Gravity Urine 1.010 1.003 - 1.035    Blood Urine Negative NEG^Negative    pH Urine 5.5 5.0 - 7.0 pH    Protein Albumin Urine Negative NEG^Negative mg/dL    Urobilinogen Urine 0.2 0.2 - 1.0 EU/dL    Nitrite Urine Negative NEG^Negative    Leukocyte Esterase Urine Negative NEG^Negative    Source Midstream Urine        ASSESSMENT/PLAN:   1. Symptoms involving urinary system  2. Urinary urgency  3. Urinary frequency  UA clear today, though symptoms did just onset earlier this morning. Will send for culture. She'll continue to monitor symptoms and will follow up with a note if these persist. Ok to consider empiric therapy given symptoms if continuing as this may have been an early sample.   - *UA reflex to Microscopic and Culture ([place] [facility] (except [place] and [place]); Future  - *UA reflex to Microscopic and Culture ([place] [facility] (except [place] and [place])  - Urine Culture Aerobic Bacterial    Davey Arteaga PA-C  [facility]

## 2017-10-04 LAB
BACTERIA SPEC CULT: NORMAL
SPECIMEN SOURCE: NORMAL

## 2017-10-09 ENCOUNTER — VIRTUAL VISIT (OUTPATIENT)
Dept: FAMILY MEDICINE | Facility: CLINIC | Age: 47
End: 2017-10-09
Payer: COMMERCIAL

## 2017-10-09 ENCOUNTER — TELEPHONE (OUTPATIENT)
Dept: FAMILY MEDICINE | Facility: CLINIC | Age: 47
End: 2017-10-09

## 2017-10-09 DIAGNOSIS — N76.0 BACTERIAL VAGINOSIS: ICD-10-CM

## 2017-10-09 DIAGNOSIS — N89.8 VAGINAL DISCHARGE: Primary | ICD-10-CM

## 2017-10-09 DIAGNOSIS — N89.8 VAGINAL DISCHARGE: ICD-10-CM

## 2017-10-09 DIAGNOSIS — B96.89 BACTERIAL VAGINOSIS: ICD-10-CM

## 2017-10-09 LAB
ALBUMIN UR-MCNC: NEGATIVE MG/DL
APPEARANCE UR: CLEAR
BILIRUB UR QL STRIP: NEGATIVE
COLOR UR AUTO: YELLOW
GLUCOSE UR STRIP-MCNC: NEGATIVE MG/DL
HGB UR QL STRIP: NEGATIVE
KETONES UR STRIP-MCNC: NEGATIVE MG/DL
LEUKOCYTE ESTERASE UR QL STRIP: NEGATIVE
NITRATE UR QL: NEGATIVE
PH UR STRIP: 6 PH (ref 5–7)
SOURCE: NORMAL
SP GR UR STRIP: 1.01 (ref 1–1.03)
SPECIMEN SOURCE: ABNORMAL
UROBILINOGEN UR STRIP-ACNC: 0.2 EU/DL (ref 0.2–1)
WET PREP SPEC: ABNORMAL

## 2017-10-09 PROCEDURE — 81003 URINALYSIS AUTO W/O SCOPE: CPT | Performed by: PHYSICIAN ASSISTANT

## 2017-10-09 PROCEDURE — 98966 PH1 ASSMT&MGMT NQHP 5-10: CPT | Performed by: PHYSICIAN ASSISTANT

## 2017-10-09 PROCEDURE — 87210 SMEAR WET MOUNT SALINE/INK: CPT | Performed by: PHYSICIAN ASSISTANT

## 2017-10-09 RX ORDER — METRONIDAZOLE 500 MG/1
500 TABLET ORAL 2 TIMES DAILY
Qty: 14 TABLET | Refills: 0 | Status: SHIPPED | OUTPATIENT
Start: 2017-10-09 | End: 2017-11-02

## 2017-10-09 NOTE — TELEPHONE ENCOUNTER
Pt calls.      She would like to come in and do a wet prep.  She has white discharge that smells foul.  She just wants to come in and do the wet prep.      Advised she would need to have either a phone or e-visit.  Scheduled a phone visit.  Spoke to lab.

## 2017-10-09 NOTE — PROGRESS NOTES
"Billie Tavarez is a 47 year old female who is being evaluated via a telephone visit.      The patient has been notified of following:     \"This telephone visit will be conducted via a call between you and your physician/provider. We have found that certain health care needs can be provided without the need for a physical exam.  This service lets us provide the care you need with a short phone conversation.  If a prescription is necessary we can send it directly to your pharmacy.  If lab work is needed we can place an order for that and you can then stop by our lab to have the test done at a later time.    We will bill your insurance company for this service.  Please check with your medical insurance if this type of visit is covered. You may be responsible for the cost of this type of visit if insurance coverage is denied.  The typical cost is $30 (10min), $59 (11-20min) and $85 (21-30min).  Most often these visits are shorter than 10 minutes.    If during the course of the call the physician/provider feels a telephone visit is not appropriate, you will not be charged for this service.\"       Consent has been obtained for this service by 2 care team members: yes. See the scanned image in the medical record.    Billie Tavarez complains of  Telephone      I have reviewed and updated the patient's Past Medical History, Social History, Family History and Medication List.    ALLERGIES  Sulfa drugs    Corey Manuel [facility]   ([place] signature)    Additional provider notes: Patient is a 48yo female with persistent vaginal irritation, discharge. Did use increased feminine hygiene products prior to onset. No hematura    Assessment/Plan:  (N89.8) Vaginal discharge  (primary encounter diagnosis); BV  Comment: Start flagyl 500mg twice daily; Stop using feminine hygiene products. Follow up if not improving.   Plan: Wet prep, *UA reflex to Microscopic and Culture        ([place] [facility] (except [place]        and " [place])            I have reviewed the note as documented above.  This accurately captures the substance of my conversation with the patient,  Billie Tavarez    Total time of call between patient and provider was 5 minutes

## 2017-10-09 NOTE — MR AVS SNAPSHOT
After Visit Summary   10/9/2017    Billie Tavarez    MRN: [id number]           Patient Information     Date Of Birth          1970        Visit Information        Provider Department      10/9/2017 10:40 AM Davey Arteaga PA-C [facility]        Today's Diagnoses     Vaginal discharge    -  1    Bacterial vaginosis           Follow-ups after your visit        Your next 10 appointments already scheduled     Oct 09, 2017 11:30 AM CDT   LAB with  LAB   [facility] ([facility])    [address] [facility] [id number] [phone]           Patient must bring picture ID. Patient should be prepared to give a urine specimen  Please do not eat 10-12 hours before your appointment if you are coming in fasting for labs on lipids, cholesterol, or glucose (sugar). Pregnant women should follow their Care Team instructions. Water with medications is okay. Do not drink coffee or other fluids. If you have concerns about taking  your medications, please ask at office or if scheduling via Equity Endeavorhart, send a message by clicking on Secure Messaging, Message Your Care Team.            Oct 12, 2017  3:30 PM CDT   Voice Eval with Allison E Alpers, SLP   [facility] ([facility])    [address]  [facility] [id number] [phone]            Nov 13, 2017   Procedure with Merline Wellington MD   [facility], [place], Same Day Surgery (--)    [address] [place] [id number] [phone]            Nov 29, 2017  8:00 AM CST   PHYSICAL with Sona Covarrubias MD   [facility] ([facility])    [address] [place] [address] [address]  [facility] [id number] [phone]              Who to contact     If you have questions or need follow up information about today's clinic visit or your schedule please contact [facility] directly at [phone].  Normal or non-critical  lab and imaging results will be communicated to you by Stackpophart, letter or phone within 4 business days after the clinic has received the results. If you do not hear from us within 7 days, please contact the clinic through CD Diagnosticst or phone. If you have a critical or abnormal lab result, we will notify you by phone as soon as possible.  Submit refill requests through EmergentDetection or call your pharmacy and they will forward the refill request to us. Please allow 3 business days for your refill to be completed.          Additional Information About Your Visit        Stackpophart Information     EmergentDetection gives you secure access to your electronic health record. If you see a primary care provider, you can also send messages to your care team and make appointments. If you have questions, please call your primary care clinic.  If you do not have a primary care provider, please call [phone] and they will assist you.        Care EveryWhere ID     This is your Care EveryWhere ID. This could be used by other organizations to access your [facility] medical records  [id number]        Your Vitals Were     Last Period                   09/08/2017 (Exact Date)            Blood Pressure from Last 3 Encounters:   10/02/17 104/64   09/20/17 117/71   09/11/17 116/70    Weight from Last 3 Encounters:   10/02/17 163 lb 4.8 oz (74.1 kg)   09/20/17 162 lb (73.5 kg)   09/11/17 162 lb 6.4 oz (73.7 kg)                 Today's Medication Changes          These changes are accurate as of: 10/9/17 11:02 AM.  If you have any questions, ask your nurse or doctor.               Start taking these medicines.        Dose/Directions    metroNIDAZOLE 500 MG tablet   Commonly known as:  FLAGYL   Used for:  Bacterial vaginosis   Started by:  Davey Arteaga PA-C        Dose:  500 mg   Take 1 tablet (500 mg) by mouth 2 times daily   Quantity:  14 tablet   Refills:  0            Where to get your medicines      These medications were sent to [facility] [place], [place] - [address]  [address] [facility] [address]     Phone:  [phone]     metroNIDAZOLE 500 MG tablet                Primary Care Provider Office Phone # Fax #    Davey Arteaga PA-C [phone] [address]  [facility] [address]        Equal Access to Services     [facility] : Hadii aad ku hadasho Soomaali, waaxda luqadaha, qaybta kaalmada adeegyada, waxay idiin hayaan adeeg kharash la'aan ah. So [facility] [phone].    ATENCIÓN: Si habla espblane, tiene a heard disposición servicios gratuitos de asistencia lingüística. [facility] [phone].    We comply with applicable federal civil rights laws and [place] laws. We do not discriminate on the basis of race, color, national origin, age, disability, sex, sexual orientation, or gender identity.            Thank you!     Thank you for choosing [facility]  for your care. Our goal is always to provide you with excellent care. Hearing back from our patients is one way we can continue to improve our services. Please take a few minutes to complete the written survey that you may receive in the mail after your visit with us. Thank you!             Your Updated Medication List - Protect others around you: Learn how to safely use, store and throw away your medicines at www.disposemymeds.org.          This list is accurate as of: 10/9/17 11:02 AM.  Always use your most recent med list.                   Brand Name Dispense Instructions for use Diagnosis    fluticasone 50 MCG/ACT spray    FLONASE    1 Bottle    Spray 1-2 sprays into both nostrils daily    Atopic rhinitis       hydroquinone 4 % Crea     30 g    Apply thin layer BID x 4-6 months then d/c    Melasma       levonorgestrel-ethinyl estradiol 0.1-20 MG-MCG per tablet    AVIANE,ALESSE,LESSINA    84 tablet    Take 1 tablet by mouth daily    Oral contraceptive pill surveillance       LORazepam 0.5 MG tablet    ATIVAN    10  tablet    Take 1 tablet (0.5 mg) by mouth as needed for anxiety    SHON (generalized anxiety disorder)       metroNIDAZOLE 500 MG tablet    FLAGYL    14 tablet    Take 1 tablet (500 mg) by mouth 2 times daily    Bacterial vaginosis       vitamin D 2000 UNITS tablet      Take 1 tablet by mouth daily

## 2017-10-12 ENCOUNTER — HOSPITAL ENCOUNTER (OUTPATIENT)
Dept: SPEECH THERAPY | Facility: CLINIC | Age: 47
Setting detail: THERAPIES SERIES
End: 2017-10-12
Attending: OTOLARYNGOLOGY
Payer: COMMERCIAL

## 2017-10-12 PROCEDURE — 40000251 ZZH STATISTIC VOICE CENTER VISIT: Performed by: STUDENT IN AN ORGANIZED HEALTH CARE EDUCATION/TRAINING PROGRAM

## 2017-10-12 PROCEDURE — 92507 TX SP LANG VOICE COMM INDIV: CPT | Mod: GN | Performed by: STUDENT IN AN ORGANIZED HEALTH CARE EDUCATION/TRAINING PROGRAM

## 2017-10-12 PROCEDURE — 92524 BEHAVRAL QUALIT ANALYS VOICE: CPT | Mod: GN | Performed by: STUDENT IN AN ORGANIZED HEALTH CARE EDUCATION/TRAINING PROGRAM

## 2017-10-13 RX ORDER — PHENAZOPYRIDINE HYDROCHLORIDE 200 MG/1
200 TABLET, FILM COATED ORAL ONCE
Status: CANCELLED | OUTPATIENT
Start: 2017-11-13

## 2017-10-27 NOTE — PROGRESS NOTES
"Shyann Kurtz OP SLP Voice Evaluation     10/12/17 1500   General Information   Type Of Visit Initial   Start Of Care Date 10/12/17   Referring Physician Celso Tristan MD  (ENT)   Orders Evaluate And Treat   Orders Comment Per order: muscle tension dysphonia and chronic throat clearing, voice and cough suppression   Medical Diagnosis Muscle tension dysphonia, chronic cough   Onset Of Illness/injury Or Date Of Surgery 09/20/17  (order date)   Precautions/Limitations no known precautions/limitations   Hearing WFL for 1:1 conversation   Avocational voice uses Pt enjoys singing in the car, but does not consider herself to be a azevedo.   Surgical/Medical history reviewed Yes   Pertinent History Of Current Problem 48yo female presenting with many year history of chronic rhinitis, cough/throat clear, and intermittent dysphonia.  Pt reports that her voice \"wears out quickly\" and that people have difficulty understanding her.  She feels increased effort and strain with voice production, especially with projection.  Her throat will hurt with voice use.  She has PND with associated cough/throat clear and mucus in the throat that has improved in the past few weeks with Flonase and saline nasal spray, although symptoms are still present.  Cough/throat clear is also triggered by dry air in her workplace and stress/anxiety. She denies swallow and breathing symptoms.  PMH significant for anxiety, IBS.   Prior Level of Functioning No previous problems.   Prior Level Of Function Comment Pt reports she has always been a quieter, more introverted person.   Patient Role/employment History Employed  (Nurse in pediatric ICU)   General Observations Pt reports that today is a typical voice day.   Patient/family Goals To be able to talk through a day of work without vocal fatigue, to reduce the cough/throat clear as much as possible   Personal Rating / Voice Use Rating   Describe the amount of voice use required for your job Moderate "   Describe the intensity of voice use required for your job Moderate   Describe the amount of typical non-work voice use Moderate   Describe the intensity of typical non-work voice use Moderate   Comments Pt has heavy vocal demands at work as a nurse.  People sometimes have difficulty hearing her because of her voice, particularly in background noise.  Her voice problems are somewhat upsetting.  VHI-10: 13/40.   Evaluation Results   Voice Observations COUGH/THROAT CLEAR: not observed.  VISIBLE TENSION: neck.  PALPATION OF THYROHYOID REGION: firm musculature with reduced thryohyoid space L>R and additional closure on phonation.  SCMs are also tight and tender, L>R.   Voice Profile during conversation, 1 min monologue and paragraph reading   Voice Quality Glottal griffith   Voice quality comments SPEECH: Intermittent mild-moderate strain and roughness vs glottal griffith.  SINGING: improved relative to speech, with strain that worsens with pitch increase.  VOWEL PROLONGATION: comfortable pitch /a/: A3, less strained than speech; high pitch /a/: E4, mild strain and breathiness; low pitch /a/: A3, increased strain.  THERAPY PROBES: most improvement in voice quality with flow mode probes.   Voice quality severity rating continuum (1=Severe, 7=WNL) 5  (CAPE-V Overall Severity: 32/100)   Breath control Tight   Breath Control comments Excessive thoracic muscle use pattern on inspiration.  Inspirations are shallow.  Talks to past end of breath stream.   Breath control severity rating continuum (1=Severe, 7=WNL) 5   Voice Use / Effort Pinched / squeezed larynx;Contraction of neck muscles;Throat push   Voice Use / Effort comments Pt rates her phonatory effort for speech as 6 out of 10 (10 is maximum effort).   Voice use / Effort severity rating continuum (1=Severe, 7=WNL) 5   Fundamental frequency (Hz) (Centered around Ab3)   Pitch /Frequency Description WNL   Pitch / Frequency severity rating continuum (1=Severe, 7=WNL) 7   Volume  "comments Volume for conversational speech is WFL and appropriate for the setting.  A whisper is normal.  Soft phonation is mildly rough.  Loud phonation is strained, but with adequate volume increase.   Volume severity rating continuum (1=Severe, 7=WNL) 6   Neuromuscular Control WNL   Neuromuscular Control severity rating continuum (1=Severe, 7=WNL) 7   Resonance WNL   Resonance severity rating continuum (1=Severe, 7=WNL) 7   Comments Mild-moderate dysphonia characterized by strain, roughness, glottal griffith, poor respiratory/phonatory coordination, increased phonatory effort, and tight laryngeal musculature with neck involvement in phonation.   Adduction /Abduction Function   Laryngeal diadokinetic speed (WNL)   Laryngeal diadokinetic strength (Strained)   Laryngeal diadokinetic consistency Regular   Adduction / Abduction function scale Age 11 - 65, norm per sec:  5+   Function of Lengtheners / Shorteners (CT and TA Muscles)   Pitch glides Upper register present  (Lowest pitch: C3; Highest pitch: B5)   Videostroboscopy / Endoscopy   Other observations Laryngoscopy performed by Dr. Tristan on 9/20/2017.  Significant findings, per Epic report: \"Examination showed the vocal folds to be mobile and meet in the midline.  No nodules, polyps or ulcerations are seen.  There is minimal to no inflammation or erythema of the supraglottic or glottic larynx.  With phonation there is moderate to severe contraction of the supraglottic larynx.  The hypopharynx is otherwise clear as is the subglottis.\"   General Therapy Interventions   Planned Therapy Interventions Voice   Voice Breath flow to sound flow;Throat clearing elimination plan;Voice quality/pitch or volume tasks;Resonant voice techniques   Impressions and Recommendations   Communication Diagnosis Dysphonia   Summary Ms. Tavarez presents with chronic cough/throat clear and mild-moderate dysphonia characterized by strain, roughness, glottal griffith, poor respiratory/phonatory " coordination, increased phonatory effort, and tight laryngeal musculature with neck involvement in phonation.  Dysphonia is accounted for by the hyperfunction of the intrinsic and extrinsic laryngeal musculature during connected speech consistent with a muscle tension dysphonia.  Cough/throat clear is accounted for by PND, but may also have a component of laryngeal irritation.  Voice, cough, and throat symptoms are irritating to Ms. Tavarez, and are preventing her from communicating effectively at work and in her social activities.   Recommendations A course of skilled speech therapy is recommended in order to optimize vocal technique, improve voice quality, and reduce laryngeal effort, fatigue, and irritation so that patient is able to meet her daily vocal demands.   Frequency and Duration Six weekly sessions with two monthly follow-ups.   Prognosis  Good with intervention   Risks and Benefits of Treatment have been explained. Yes   Patient & /or Caregiver  in agreement with plan of care Yes   Patient Education SLP provided education regarding muscle tension dysphonia causes and treatment.  Therapy initiated today.   Educational Assessment   Barriers to Learning No barriers   Preferred Learning Style Listening;Reading;Demonstration;Pictures / Video   Voice Goals   Voice Goals 1;2;3   Voice Goal 1   Goal Identifier Generalization   Goal Description Patient will report a week of typical vocal activities in which dysphonia, effort, and fatigue do not exceed a level of 2 out of 10, 90% of the time, so that patient is able to meet her vocal demands.   Target Date 02/12/18   Voice Goal 2   Goal Identifier Voice quality   Goal Description In a 20-minute conversation task, patient will demonstrate strain, roughness, and glottal griffith that do not exceed a level of 2 out of 10, 90% of the time by SLP judgment, so that patient is able to meet her voice quality demands.   Target Date 02/12/18   Voice Goal 3   Goal Identifier  Hygiene/cough   Goal Description Patient will learn, demonstrate, and implement 2-3 vocal hygiene strategies (e.g. hydration, cough suppression) to report a week of typical activities in which the cough/throat clear does not exceed a level of 2 out of 10, 90% of the time.   Target Date 02/12/18   Total Session Time   Total Session Time 45   Total Evaluation Time 30     Thank you for the referral of this patient.    Allison Alpers, B.A. (music), M.A., CCC-SLP  Speech-Language Pathologist  Certificate of Vocology  [facility]  [phone]

## 2017-11-02 ENCOUNTER — OFFICE VISIT (OUTPATIENT)
Dept: FAMILY MEDICINE | Facility: CLINIC | Age: 47
End: 2017-11-02
Payer: COMMERCIAL

## 2017-11-02 VITALS
SYSTOLIC BLOOD PRESSURE: 102 MMHG | HEIGHT: 64 IN | BODY MASS INDEX: 26.67 KG/M2 | OXYGEN SATURATION: 98 % | DIASTOLIC BLOOD PRESSURE: 62 MMHG | TEMPERATURE: 97.6 F | WEIGHT: 156.2 LBS | HEART RATE: 78 BPM

## 2017-11-02 DIAGNOSIS — Z01.818 PREOP GENERAL PHYSICAL EXAM: Primary | ICD-10-CM

## 2017-11-02 DIAGNOSIS — N62 MACROMASTIA: ICD-10-CM

## 2017-11-02 DIAGNOSIS — N90.89 LESION OF LABIA: ICD-10-CM

## 2017-11-02 DIAGNOSIS — L98.7 EXCESS SKIN OF ABDOMEN: ICD-10-CM

## 2017-11-02 LAB — HGB BLD-MCNC: 13.7 G/DL (ref 11.7–15.7)

## 2017-11-02 PROCEDURE — 85018 HEMOGLOBIN: CPT | Performed by: PHYSICIAN ASSISTANT

## 2017-11-02 PROCEDURE — 36415 COLL VENOUS BLD VENIPUNCTURE: CPT | Performed by: PHYSICIAN ASSISTANT

## 2017-11-02 PROCEDURE — 80048 BASIC METABOLIC PNL TOTAL CA: CPT | Performed by: PHYSICIAN ASSISTANT

## 2017-11-02 PROCEDURE — 99214 OFFICE O/P EST MOD 30 MIN: CPT | Performed by: PHYSICIAN ASSISTANT

## 2017-11-02 NOTE — MR AVS SNAPSHOT
After Visit Summary   11/2/2017    Billie Tavarez    MRN: [id number]           Patient Information     Date Of Birth          1970        Visit Information        Provider Department      11/2/2017 9:40 AM Davey Arteaga PA-C [facility]        Today's Diagnoses     Preop general physical exam    -  1    Lesion of labia        Macromastia          Care Instructions      Before Your Surgery      Call your surgeon if there is any change in your health. This includes signs of a cold or flu (such as a sore throat, runny nose, cough, rash or fever).    Do not smoke, drink alcohol or take over the counter medicine (unless your surgeon or primary care doctor tells you to) for the 24 hours before and after surgery.    If you take prescribed drugs: Follow your doctor s orders about which medicines to take and which to stop until after surgery.    Eating and drinking prior to surgery: follow the instructions from your surgeon    Take a shower or bath the night before surgery. Use the soap your surgeon gave you to gently clean your skin. If you do not have soap from your surgeon, use your regular soap. Do not shave or scrub the surgery site.  Wear clean pajamas and have clean sheets on your bed.           Follow-ups after your visit        Your next 10 appointments already scheduled     Nov 13, 2017   Procedure with Merline Wellington MD   [facility] [place], Same Day Surgery (--)    [address] [place] [id number] [phone]            Nov 29, 2017 11:30 AM CST   PHYSICAL with Sona Covarrubias MD   [facility] ([facility]    [address] [place] [id number] [phone]              Who to contact     If you have questions or need follow up information about today's clinic visit or your schedule please contact [facility] directly at [phone].  Normal or non-critical lab and imaging results will  "be communicated to you by Planexhart, letter or phone within 4 business days after the clinic has received the results. If you do not hear from us within 7 days, please contact the clinic through Krugle or phone. If you have a critical or abnormal lab result, we will notify you by phone as soon as possible.  Submit refill requests through Krugle or call your pharmacy and they will forward the refill request to us. Please allow 3 business days for your refill to be completed.          Additional Information About Your Visit        Krugle Information     Krugle gives you secure access to your electronic health record. If you see a primary care provider, you can also send messages to your care team and make appointments. If you have questions, please call your primary care clinic.  If you do not have a primary care provider, please call [phone] and they will assist you.        Care EveryWhere ID     This is your Care EveryWhere ID. This could be used by other organizations to access your [facility] medical records  [id number]        Your Vitals Were     Pulse Temperature Height Pulse Oximetry BMI (Body Mass Index)       78 97.6  F (36.4  C) (Oral) 5' 4\" (1.626 m) 98% 26.81 kg/m2        Blood Pressure from Last 3 Encounters:   11/02/17 102/62   10/02/17 104/64   09/20/17 117/71    Weight from Last 3 Encounters:   11/02/17 156 lb 3.2 oz (70.9 kg)   10/02/17 163 lb 4.8 oz (74.1 kg)   09/20/17 162 lb (73.5 kg)              We Performed the Following     Basic metabolic panel     Hemoglobin          Today's Medication Changes          These changes are accurate as of: 11/2/17 10:14 AM.  If you have any questions, ask your nurse or doctor.               Stop taking these medicines if you haven't already. Please contact your care team if you have questions.     hydroquinone 4 % Crea   Stopped by:  Davey Arteaga PA-C           levonorgestrel-ethinyl estradiol 0.1-20 MG-MCG per tablet   Commonly known as:  " TELLY GREEN LESSINA   Stopped by:  Davey Arteaga PA-C           metroNIDAZOLE 500 MG tablet   Commonly known as:  FLAGYL   Stopped by:  Davey Arteaga PA-C                    Primary Care Provider Office Phone # Fax #    Davey Arteaga PA-C [phone] [address]  [facility] [address]        Equal Access to Services     [facility]: Hadii aad ku hadasho Soomaali, waaxda luqadaha, qaybta kaalmada adeegyada, waxay idiin hayaan adeeg kharash la'aan ah. So [facility] [place] [facility] [phone].    ATENCIÓN: Si daxala sherri, tiene a heard disposición servicios gratuitos de asistencia lingüística. Llame al [phone].    We comply with applicable federal civil rights laws and [place] laws. We do not discriminate on the basis of race, color, national origin, age, disability, sex, sexual orientation, or gender identity.            Thank you!     Thank you for choosing [facility]  for your care. Our goal is always to provide you with excellent care. Hearing back from our patients is one way we can continue to improve our services. Please take a few minutes to complete the written survey that you may receive in the mail after your visit with us. Thank you!             Your Updated Medication List - Protect others around you: Learn how to safely use, store and throw away your medicines at www.disposemymeds.org.          This list is accurate as of: 11/2/17 10:14 AM.  Always use your most recent med list.                   Brand Name Dispense Instructions for use Diagnosis    fluticasone 50 MCG/ACT spray    FLONASE    1 Bottle    Spray 1-2 sprays into both nostrils daily    Atopic rhinitis       LORazepam 0.5 MG tablet    ATIVAN    10 tablet    Take 1 tablet (0.5 mg) by mouth as needed for anxiety    SHON (generalized anxiety disorder)       vitamin D 2000 UNITS tablet      Take 1 tablet by mouth daily

## 2017-11-02 NOTE — NURSING NOTE
"Chief Complaint   Patient presents with     Pre-Op Exam       Initial /62 (BP Location: Right arm, Cuff Size: Adult Large)  Pulse 78  Temp 97.6  F (36.4  C) (Oral)  Ht 5' 4\" (1.626 m)  Wt 156 lb 3.2 oz (70.9 kg)  SpO2 98%  BMI 26.81 kg/m2 Estimated body mass index is 26.81 kg/(m^2) as calculated from the following:    Height as of this encounter: 5' 4\" (1.626 m).    Weight as of this encounter: 156 lb 3.2 oz (70.9 kg).  Medication Reconciliation: complete   Corey Manuel CMA      "

## 2017-11-02 NOTE — PROGRESS NOTES
[facility]  [address] [facility] [address] [phone]  Dept: [phone]    PRE-OP EVALUATION:  Today's date: 2017    Billie Tavarez (: 1970) presents for pre-operative evaluation assessment as requested by Dr. Wellington,  .  She requires evaluation and anesthesia risk assessment prior to undergoing surgery/procedure for treatment of Labia,() Abdominal and Breast(11-15) .  Proposed procedure: Left labium cyst, Breast Reduction and Abdominoplasty    Date of Surgery/ Procedure: 11-13/ 11-15  Time of Surgery/ Procedure: 8am/ 10am  Hospital/Surgical Facility: [facility]  Fax number for surgical facility: Yamile[facility] Griggs [phone]  Primary Physician: Davey Arteaga  Type of Anesthesia Anticipated: Conscious sedation and general     Patient has a Health Care Directive or Living Will:  NO    Preop Questions 2017   1.  Do you have a history of heart attack, stroke, stent, bypass or surgery on an artery in the head, neck, heart or legs? No   2.  Do you ever have any pain or discomfort in your chest? No   3.  Do you have a history of  Heart Failure? No   4.   Are you troubled by shortness of breath when:  walking on a level surface, or up a slight hill, or at night? No   5.  Do you currently have a cold, bronchitis or other respiratory infection? No   6.  Do you have a cough, shortness of breath, or wheezing? No   7.  Do you sometimes get pains in the calves of your legs when you walk? No   8. Do you or anyone in your family have previous history of blood clots? No   9.  Do you or does anyone in your family have a serious bleeding problem such as prolonged bleeding following surgeries or cuts? No   10. Have you ever had problems with anemia or been told to take iron pills? No   11. Have you had any abnormal blood loss such as black, tarry or bloody stools, or abnormal vaginal bleeding? No   12. Have you ever had a blood  transfusion? No   13. Have you or any of your relatives ever had problems with anesthesia? No   14. Do you have sleep apnea, excessive snoring or daytime drowsiness? No   15. Do you have any prosthetic heart valves? No   16. Do you have prosthetic joints? No   17. Is there any chance that you may be pregnant? No           HPI:                                                      Brief HPI related to upcoming procedure: Patient notes a multiple month hx of labial cyst, increasingly irritation, failure of routine care. She will also be undergoing breast augmentation and abdominoplasty      See problem list for active medical problems.  Problems all longstanding and stable, except as noted/documented.  See ROS for pertinent symptoms related to these conditions.                                                                                                  .    MEDICAL HISTORY:                                                    Patient Active Problem List    Diagnosis Date Noted     Vitamin D deficiency 04/20/2015     Priority: Medium     Problem list name updated by automated process. Provider to review       Rosacea 07/14/2013     Priority: Medium     Inflamed seborrheic keratosis 07/14/2013     Priority: Medium     Dyschromia 07/14/2013     Priority: Medium     Imo Update utility       Rash 06/18/2013     Priority: Medium     Colon polyp 05/13/2013     Priority: Medium     Family history of diabetes mellitus 11/21/2011     Priority: Medium     Family history of coronary artery disease 11/21/2011     Priority: Medium     Elevated rheumatoid factor 11/21/2011     Priority: Medium     Obesity 11/21/2011     Priority: Medium     Anxiety 11/21/2011     Priority: Medium     Diarrhea 11/21/2011     Priority: Medium     Atopic rhinitis 10/07/2011     Priority: Medium     (Problem list name updated by automated process. Provider to review and confirm.)       GERD (gastroesophageal reflux disease) 01/14/2011     Priority:  Medium     Quality/mj       CARDIOVASCULAR SCREENING; LDL GOAL LESS THAN 160 10/31/2010     Priority: Medium     Mild major depression (H) 09/02/2010     Priority: Medium      Past Medical History:   Diagnosis Date     Anxiety      Gluten intolerance      IBS (irritable bowel syndrome)      Rheumatoid arthritis(714.0)     Abstracted 5/24/02.     Rosacea      Past Surgical History:   Procedure Laterality Date     ARTHROPLASTY HIP      R     ENHANCE LASER REFRACTIVE EXISTING PT IN PARAMETERS Right 2/20/2017    Procedure: ENHANCE LASER REFRACTIVE EXISTING PT IN PARAMETERS;  Surgeon: Leandro Martinez MD;  [facility]     LASIK Left 1/18/2016    Procedure: LASIK;  Surgeon: Leandro Martinez MD;  [facility]:  [facility]     LASIK CUSTOMVUE Right 1/9/2017    Procedure: LASIK CUSTOMVUE;  Surgeon: Leandro Martinez MD;  [facility]:  [facility]     TONSILLECTOMY       TUBAL LIGATION       WAVESCAN SCREENING Left 1/18/2016    Procedure: WAVESCAN SCREENING;  Surgeon: Leandro Martinez MD;  [facility]:  [facility]     WAVESCAN SCREENING Right 1/9/2017    Procedure: WAVESCAN SCREENING;  Surgeon: Leandro Martinez MD;  [facility]:  [facility]     WAVESCAN SCREENING Right 2/20/2017    Procedure: WAVESCAN SCREENING;  Surgeon: Leandro Martinez MD;  Location: [facility]     Current Outpatient Prescriptions   Medication Sig Dispense Refill     metroNIDAZOLE (FLAGYL) 500 MG tablet Take 1 tablet (500 mg) by mouth 2 times daily 14 tablet 0     levonorgestrel-ethinyl estradiol (AVIANE,ALESSE,LESSINA) 0.1-20 MG-MCG per tablet Take 1 tablet by mouth daily 84 tablet 3     LORazepam (ATIVAN) 0.5 MG tablet Take 1 tablet (0.5 mg) by mouth as needed for anxiety 10 tablet 0     hydroquinone 4 % CREA Apply thin layer BID x 4-6 months then d/c (Patient not taking: Reported on 10/9/2017) 30 g 5     fluticasone (FLONASE) 50 MCG/ACT nasal spray Spray 1-2 sprays into both nostrils daily 1 Bottle 1     Cholecalciferol (VITAMIN D) 2000 UNITS tablet Take 1 tablet by mouth daily    "    OTC products: None, except as noted above    Allergies   Allergen Reactions     Sulfa Drugs Other (See Comments)     Anaphylaxis- hives, nausea, vomiting      Latex Allergy: NO    Social History   Substance Use Topics     Smoking status: Former Smoker     Packs/day: 0.50     Years: 6.00     Types: Cigarettes     Quit date: 3/3/1995     Smokeless tobacco: Never Used      Comment: quit 1994     Alcohol use 2.0 oz/week     4 Standard drinks or equivalent per week      Comment: socially     History   Drug Use No       REVIEW OF SYSTEMS:                                                    C: NEGATIVE for fever, chills, change in weight  I: NEGATIVE for worrisome rashes, moles or lesions  E: NEGATIVE for vision changes or irritation  E/M: NEGATIVE for ear, mouth and throat problems  R: NEGATIVE for significant cough or SOB  B: NEGATIVE for masses, tenderness or discharge  CV: NEGATIVE for chest pain, palpitations or peripheral edema  GI: NEGATIVE for nausea, abdominal pain, heartburn, or change in bowel habits   female: vaginal irritation  M: NEGATIVE for significant arthralgias or myalgia  N: NEGATIVE for weakness, dizziness or paresthesias  E: NEGATIVE for temperature intolerance, skin/hair changes  H: NEGATIVE for bleeding problems  P: NEGATIVE for changes in mood or affect    EXAM:                                                    /62 (BP Location: Right arm, Cuff Size: Adult Large)  Pulse 78  Temp 97.6  F (36.4  C) (Oral)  Ht 5' 4\" (1.626 m)  Wt 156 lb 3.2 oz (70.9 kg)  SpO2 98%  BMI 26.81 kg/m2    GENERAL APPEARANCE: healthy, alert and no distress     EYES: EOMI, PERRL     HENT: ear canals and TM's normal and nose and mouth without ulcers or lesions     RESP: lungs clear to auscultation - no rales, rhonchi or wheezes     CV: regular rates and rhythm     ABDOMEN:  soft, nontender, no HSM or masses and bowel sounds normal     MS: extremities normal- no gross deformities noted, no evidence of " inflammation in joints, FROM in all extremities.     NEURO: Normal strength and tone, sensory exam grossly normal, mentation intact and speech normal     PSYCH: mentation appears normal. and affect normal/bright    DIAGNOSTICS:                                                      11/02/17:  Hemoglobin: 13.7  Serum Potassium: 4.4  Serum Creatinine: 0.82    Recent Labs   Lab Test  11/08/13   0944  08/26/13   0903   HGB  13.9  13.8   PLT  348  324   NA  142  139   POTASSIUM  4.6  4.0   CR  0.78  0.60      IMPRESSION:                                                    Reason for surgery/procedure: Excision of labial mass; Breast lift/reduction/abdominoplasty  Diagnosis/reason for consult: Pre-operative consult    The proposed surgical procedure is considered INTERMEDIATE risk.    REVISED CARDIAC RISK INDEX  The patient has the following serious cardiovascular risks for perioperative complications such as (MI, PE, VFib and 3  AV Block):  No serious cardiac risks  INTERPRETATION: 0 risks: Class I (very low risk - 0.4% complication rate)    The patient has the following additional risks for perioperative complications:  No identified additional risks      ICD-10-CM    1. Preop general physical exam Z01.818 Hemoglobin     Basic metabolic panel   2. Lesion of labia N90.89    3. Macromastia N62    4. Excess skin of abdomen L98.7        RECOMMENDATIONS:                                                      --Pt advised to avoid NSAIDS (Motrin, Ibuprofen, Aleve or Naprosyn);  If needed, Tylenol or Acetaminophen are fine to use.  --meds reviewed; no current daily medicaitons        --Pain medications, time off from work and FMLA following surgery deferred to surgeon.      APPROVAL GIVEN to proceed with proposed procedure, without further diagnostic evaluation       Signed Electronically by: Davey Arteaga PA-C    Copy of this evaluation report is provided to requesting physician.    [facility] Preop Guidelines

## 2017-11-03 LAB
ANION GAP SERPL CALCULATED.3IONS-SCNC: 8 MMOL/L (ref 3–14)
BUN SERPL-MCNC: 17 MG/DL (ref 7–30)
CALCIUM SERPL-MCNC: 8.9 MG/DL (ref 8.5–10.1)
CHLORIDE SERPL-SCNC: 105 MMOL/L (ref 94–109)
CO2 SERPL-SCNC: 27 MMOL/L (ref 20–32)
CREAT SERPL-MCNC: 0.82 MG/DL (ref 0.52–1.04)
GFR SERPL CREATININE-BSD FRML MDRD: 75 ML/MIN/1.7M2
GLUCOSE SERPL-MCNC: 78 MG/DL (ref 70–99)
POTASSIUM SERPL-SCNC: 4.4 MMOL/L (ref 3.4–5.3)
SODIUM SERPL-SCNC: 140 MMOL/L (ref 133–144)

## 2017-11-12 ENCOUNTER — ANESTHESIA EVENT (OUTPATIENT)
Dept: SURGERY | Facility: CLINIC | Age: 47
End: 2017-11-12
Payer: COMMERCIAL

## 2017-11-12 ASSESSMENT — LIFESTYLE VARIABLES: TOBACCO_USE: 1

## 2017-11-13 ENCOUNTER — HOSPITAL ENCOUNTER (OUTPATIENT)
Facility: CLINIC | Age: 47
Discharge: HOME | End: 2017-11-13
Attending: OBSTETRICS & GYNECOLOGY | Admitting: OBSTETRICS & GYNECOLOGY
Payer: COMMERCIAL

## 2017-11-13 ENCOUNTER — ANESTHESIA (OUTPATIENT)
Dept: SURGERY | Facility: CLINIC | Age: 47
End: 2017-11-13
Payer: COMMERCIAL

## 2017-11-13 VITALS
HEIGHT: 64 IN | OXYGEN SATURATION: 99 % | TEMPERATURE: 98.1 F | DIASTOLIC BLOOD PRESSURE: 67 MMHG | BODY MASS INDEX: 26.31 KG/M2 | RESPIRATION RATE: 12 BRPM | SYSTOLIC BLOOD PRESSURE: 112 MMHG | WEIGHT: 154.1 LBS

## 2017-11-13 DIAGNOSIS — N90.7 VULVAR CYST: Primary | ICD-10-CM

## 2017-11-13 LAB
ABO + RH BLD: NORMAL
ABO + RH BLD: NORMAL
BLD GP AB SCN SERPL QL: NORMAL
BLOOD BANK CMNT PATIENT-IMP: NORMAL
GLUCOSE SERPL-MCNC: 88 MG/DL (ref 70–99)
SPECIMEN EXP DATE BLD: NORMAL

## 2017-11-13 PROCEDURE — 27210794 ZZH OR GENERAL SUPPLY STERILE: Performed by: OBSTETRICS & GYNECOLOGY

## 2017-11-13 PROCEDURE — 37000009 ZZH ANESTHESIA TECHNICAL FEE, EACH ADDTL 15 MIN: Performed by: OBSTETRICS & GYNECOLOGY

## 2017-11-13 PROCEDURE — 71000027 ZZH RECOVERY PHASE 2 EACH 15 MINS: Performed by: OBSTETRICS & GYNECOLOGY

## 2017-11-13 PROCEDURE — 86850 RBC ANTIBODY SCREEN: CPT | Performed by: ANESTHESIOLOGY

## 2017-11-13 PROCEDURE — 11421 EXC H-F-NK-SP B9+MARG 0.6-1: CPT | Mod: GC | Performed by: OBSTETRICS & GYNECOLOGY

## 2017-11-13 PROCEDURE — 25000128 H RX IP 250 OP 636: Performed by: NURSE ANESTHETIST, CERTIFIED REGISTERED

## 2017-11-13 PROCEDURE — 86900 BLOOD TYPING SEROLOGIC ABO: CPT | Performed by: ANESTHESIOLOGY

## 2017-11-13 PROCEDURE — 82947 ASSAY GLUCOSE BLOOD QUANT: CPT | Performed by: ANESTHESIOLOGY

## 2017-11-13 PROCEDURE — 25000125 ZZHC RX 250: Performed by: NURSE ANESTHETIST, CERTIFIED REGISTERED

## 2017-11-13 PROCEDURE — 40000170 ZZH STATISTIC PRE-PROCEDURE ASSESSMENT II: Performed by: OBSTETRICS & GYNECOLOGY

## 2017-11-13 PROCEDURE — 86901 BLOOD TYPING SEROLOGIC RH(D): CPT | Performed by: ANESTHESIOLOGY

## 2017-11-13 PROCEDURE — 88304 TISSUE EXAM BY PATHOLOGIST: CPT | Mod: 26 | Performed by: OBSTETRICS & GYNECOLOGY

## 2017-11-13 PROCEDURE — 36000051 ZZH SURGERY LEVEL 2 1ST 30 MIN - UMMC: Performed by: OBSTETRICS & GYNECOLOGY

## 2017-11-13 PROCEDURE — 88304 TISSUE EXAM BY PATHOLOGIST: CPT | Performed by: OBSTETRICS & GYNECOLOGY

## 2017-11-13 PROCEDURE — 36415 COLL VENOUS BLD VENIPUNCTURE: CPT | Performed by: ANESTHESIOLOGY

## 2017-11-13 PROCEDURE — 25000125 ZZHC RX 250: Performed by: OBSTETRICS & GYNECOLOGY

## 2017-11-13 PROCEDURE — 25000132 ZZH RX MED GY IP 250 OP 250 PS 637: Performed by: OBSTETRICS & GYNECOLOGY

## 2017-11-13 PROCEDURE — 36000053 ZZH SURGERY LEVEL 2 EA 15 ADDTL MIN - UMMC: Performed by: OBSTETRICS & GYNECOLOGY

## 2017-11-13 PROCEDURE — 37000008 ZZH ANESTHESIA TECHNICAL FEE, 1ST 30 MIN: Performed by: OBSTETRICS & GYNECOLOGY

## 2017-11-13 RX ORDER — HYDROCODONE BITARTRATE AND ACETAMINOPHEN 5; 325 MG/1; MG/1
1-2 TABLET ORAL EVERY 4 HOURS PRN
Qty: 10 TABLET | Refills: 0 | Status: SHIPPED | OUTPATIENT
Start: 2017-11-13 | End: 2018-03-08

## 2017-11-13 RX ORDER — FENTANYL CITRATE 50 UG/ML
INJECTION, SOLUTION INTRAMUSCULAR; INTRAVENOUS PRN
Status: DISCONTINUED | OUTPATIENT
Start: 2017-11-13 | End: 2017-11-13

## 2017-11-13 RX ORDER — ONDANSETRON 4 MG/1
4 TABLET, ORALLY DISINTEGRATING ORAL EVERY 30 MIN PRN
Status: DISCONTINUED | OUTPATIENT
Start: 2017-11-13 | End: 2017-11-13 | Stop reason: HOSPADM

## 2017-11-13 RX ORDER — DEXAMETHASONE SODIUM PHOSPHATE 4 MG/ML
INJECTION, SOLUTION INTRA-ARTICULAR; INTRALESIONAL; INTRAMUSCULAR; INTRAVENOUS; SOFT TISSUE PRN
Status: DISCONTINUED | OUTPATIENT
Start: 2017-11-13 | End: 2017-11-13

## 2017-11-13 RX ORDER — FENTANYL CITRATE 50 UG/ML
25-50 INJECTION, SOLUTION INTRAMUSCULAR; INTRAVENOUS
Status: DISCONTINUED | OUTPATIENT
Start: 2017-11-13 | End: 2017-11-13 | Stop reason: HOSPADM

## 2017-11-13 RX ORDER — NALOXONE HYDROCHLORIDE 0.4 MG/ML
.1-.4 INJECTION, SOLUTION INTRAMUSCULAR; INTRAVENOUS; SUBCUTANEOUS
Status: DISCONTINUED | OUTPATIENT
Start: 2017-11-13 | End: 2017-11-13 | Stop reason: HOSPADM

## 2017-11-13 RX ORDER — LIDOCAINE HYDROCHLORIDE AND EPINEPHRINE 10; 10 MG/ML; UG/ML
INJECTION, SOLUTION INFILTRATION; PERINEURAL PRN
Status: DISCONTINUED | OUTPATIENT
Start: 2017-11-13 | End: 2017-11-13 | Stop reason: HOSPADM

## 2017-11-13 RX ORDER — ONDANSETRON 2 MG/ML
INJECTION INTRAMUSCULAR; INTRAVENOUS PRN
Status: DISCONTINUED | OUTPATIENT
Start: 2017-11-13 | End: 2017-11-13

## 2017-11-13 RX ORDER — HYDROCODONE BITARTRATE AND ACETAMINOPHEN 5; 325 MG/1; MG/1
1-2 TABLET ORAL
Status: COMPLETED | OUTPATIENT
Start: 2017-11-13 | End: 2017-11-13

## 2017-11-13 RX ORDER — IBUPROFEN 600 MG/1
600 TABLET, FILM COATED ORAL
Status: DISCONTINUED | OUTPATIENT
Start: 2017-11-13 | End: 2017-11-13 | Stop reason: HOSPADM

## 2017-11-13 RX ORDER — SODIUM CHLORIDE, SODIUM LACTATE, POTASSIUM CHLORIDE, CALCIUM CHLORIDE 600; 310; 30; 20 MG/100ML; MG/100ML; MG/100ML; MG/100ML
INJECTION, SOLUTION INTRAVENOUS CONTINUOUS PRN
Status: DISCONTINUED | OUTPATIENT
Start: 2017-11-13 | End: 2017-11-13

## 2017-11-13 RX ORDER — LIDOCAINE 40 MG/G
CREAM TOPICAL
Status: DISCONTINUED | OUTPATIENT
Start: 2017-11-13 | End: 2017-11-13 | Stop reason: HOSPADM

## 2017-11-13 RX ORDER — SODIUM CHLORIDE, SODIUM LACTATE, POTASSIUM CHLORIDE, CALCIUM CHLORIDE 600; 310; 30; 20 MG/100ML; MG/100ML; MG/100ML; MG/100ML
INJECTION, SOLUTION INTRAVENOUS CONTINUOUS
Status: DISCONTINUED | OUTPATIENT
Start: 2017-11-13 | End: 2017-11-13 | Stop reason: HOSPADM

## 2017-11-13 RX ORDER — ONDANSETRON 2 MG/ML
4 INJECTION INTRAMUSCULAR; INTRAVENOUS EVERY 30 MIN PRN
Status: DISCONTINUED | OUTPATIENT
Start: 2017-11-13 | End: 2017-11-13 | Stop reason: HOSPADM

## 2017-11-13 RX ORDER — PROPOFOL 10 MG/ML
INJECTION, EMULSION INTRAVENOUS PRN
Status: DISCONTINUED | OUTPATIENT
Start: 2017-11-13 | End: 2017-11-13

## 2017-11-13 RX ORDER — PROPOFOL 10 MG/ML
INJECTION, EMULSION INTRAVENOUS CONTINUOUS PRN
Status: DISCONTINUED | OUTPATIENT
Start: 2017-11-13 | End: 2017-11-13

## 2017-11-13 RX ORDER — LIDOCAINE HYDROCHLORIDE 20 MG/ML
INJECTION, SOLUTION INFILTRATION; PERINEURAL PRN
Status: DISCONTINUED | OUTPATIENT
Start: 2017-11-13 | End: 2017-11-13

## 2017-11-13 RX ADMIN — PROPOFOL 100 MCG/KG/MIN: 10 INJECTION, EMULSION INTRAVENOUS at 07:44

## 2017-11-13 RX ADMIN — LIDOCAINE HYDROCHLORIDE 50 MG: 20 INJECTION, SOLUTION INFILTRATION; PERINEURAL at 07:44

## 2017-11-13 RX ADMIN — DEXAMETHASONE SODIUM PHOSPHATE 4 MG: 4 INJECTION, SOLUTION INTRAMUSCULAR; INTRAVENOUS at 08:00

## 2017-11-13 RX ADMIN — FENTANYL CITRATE 50 MCG: 50 INJECTION, SOLUTION INTRAMUSCULAR; INTRAVENOUS at 07:44

## 2017-11-13 RX ADMIN — ONDANSETRON 4 MG: 2 INJECTION INTRAMUSCULAR; INTRAVENOUS at 08:00

## 2017-11-13 RX ADMIN — SODIUM CHLORIDE, POTASSIUM CHLORIDE, SODIUM LACTATE AND CALCIUM CHLORIDE: 600; 310; 30; 20 INJECTION, SOLUTION INTRAVENOUS at 07:40

## 2017-11-13 RX ADMIN — HYDROCODONE BITARTRATE AND ACETAMINOPHEN 1 TABLET: 5; 325 TABLET ORAL at 10:14

## 2017-11-13 RX ADMIN — MIDAZOLAM HYDROCHLORIDE 2 MG: 1 INJECTION, SOLUTION INTRAMUSCULAR; INTRAVENOUS at 07:41

## 2017-11-13 RX ADMIN — PROPOFOL 30 MG: 10 INJECTION, EMULSION INTRAVENOUS at 07:44

## 2017-11-13 NOTE — OP NOTE
DATE OF SERVICE:  2017      SUBJECTIVE:  This 47-year-old woman is taken to the operating room for excision of left vulvar cyst.  The cyst was drained in the office but recurred, causing discomfort.  She requested excision with anesthesia because she had had considerable discomfort during the office procedure.  Please see clinic notes for further details.      OPERATIVE PROCEDURE:  The patient was taken to the operating room and placed in dorsal lithotomy position.  She was prepped and draped.  Examination revealed a small 1/2-1 cm raised cystic mass in the left labium minora.  Local anesthetic, a total of 0.8 mL of 1% lidocaine with 1:100,000 epinephrine, was injected over the cyst.  The cyst was then grasped with the pickups and sharply dissected free using a small curved scissors.  The skin was then reapproximated using continuous 4-0 Vicryl.  Hemostasis was good.  Petroleum jelly dressing was placed on the wound.  She was stable at the conclusion.      ESTIMATED BLOOD LOSS: 1 mL      PREOPERATIVE DIAGNOSIS:  Symptomatic left vulvar cyst, recurrent after drainage.      OPERATION PROPOSED:  Excision of left vulvar cyst.      OPERATION PERFORMED:  Excision of left vulvar cyst.      SURGEON:  Phyllis Oneill MD      COMPLICATIONS:  None.         PHYLLIS ONEILL MD             D: 2017 08:49   T: 2017 09:54   MT: JUANIS      Name:     GREG PATTERSON   MRN:      [id number]        Account:        [id number]   :      1970           Procedure Date: 2017      Document: [id number]

## 2017-11-13 NOTE — IP AVS SNAPSHOT
[facility] PREOP/PHASE II    [address] [place] [address]    Phone:  [phone]                                       After Visit Summary   11/13/2017    Billie Tavarez    MRN: [id number]           After Visit Summary Signature Page     I have received my discharge instructions, and my questions have been answered. I have discussed any challenges I see with this plan with the nurse or doctor.    ..........................................................................................................................................  Patient/Patient Representative Signature      ..........................................................................................................................................  Patient Representative Print Name and Relationship to Patient    ..................................................               ................................................  Date                                            Time    ..........................................................................................................................................  Reviewed by Signature/Title    ...................................................              ..............................................  Date                                                            Time

## 2017-11-13 NOTE — OR NURSING
Pt refused HCG;  Stated she had tubes tied 21 years ago and is not pregnant.  Will let anesthesia know.

## 2017-11-13 NOTE — PROGRESS NOTES
Operative Note    PRE OP DIAGNOSIS Left vulvar cyst, symptomatic    OPERATION PROPOSED Excision of left vulvar cyst    OPERATION PERFORMED Same    POST OP DIAGNOSIS  Same    SURGEON GRETEL Wellington MD    ASSISTANT 0    FINDINGS  Small left vulvar cyst, excised.     EBL 1 cc    COMPLICATIONS None    OPERATIVE REPORT DICTATED.      GRETEL Wellington MD

## 2017-11-13 NOTE — IP AVS SNAPSHOT
MRN:[id number]                      After Visit Summary   11/13/2017    Billie Tavarez    MRN: [id number]           Thank you!     Thank you for choosing [facility] for your care. Our goal is always to provide you with excellent care. Hearing back from our patients is one way we can continue to improve our services. Please take a few minutes to complete the written survey that you may receive in the mail after you visit with us. Thank you!        Patient Information     Date Of Birth          1970        About your hospital stay     You were admitted on:  November 13, 2017 You last received care in the:  [facility] PREOP/PHASE II    You were discharged on:  November 13, 2017       Who to Call     For medical emergencies, please call 911.  For non-urgent questions about your medical care, please call your primary care provider or clinic, [phone]  For questions related to your surgery, please call your surgery clinic        Attending Provider     Provider Specialty    Merline Wellington MD OB/Gyn       Primary Care Provider Office Phone # Fax #    Davey Arteaga PA-C [phone]      After Care Instructions     Discharge Instructions       Pelvic Rest. No tampons, douching or intercourse for  2  weeks.                  Your next 10 appointments already scheduled     Nov 29, 2017 11:30 AM CST   PHYSICAL with Sona Covarrubias MD   [facility] ([facility])    [address] [place] [address] [phone]              Further instructions from your care team       Same-Day Surgery   Adult Discharge Orders & Instructions     For 24 hours after surgery:  1. Get plenty of rest.  A responsible adult must stay with you for at least 24 hours after you leave the hospital.   2. Pain medication can slow your reflexes. Do not drive or use heavy equipment.  If you have weakness or tingling, don't drive or use heavy equipment until  this feeling goes away.  3. Mixing alcohol and pain medication can cause dizziness and slow your breathing. It can even be fatal. Do not drink alcohol while taking pain medication.  4. Avoid strenuous or risky activities.  Ask for help when climbing stairs.   5. You may feel lightheaded.  If so, sit for a few minutes before standing.  Have someone help you get up.   6. If you have nausea (feel sick to your stomach), drink only clear liquids such as apple juice, ginger ale, broth or 7-Up.  Rest may also help.  Be sure to drink enough fluids.  Move to a regular diet as you feel able. Take pain medications with a small amount of solid food, such as toast or crackers, to avoid nausea.   7. A slight fever is normal. Call the doctor if your fever is over 100 F (37.7 C) (taken under the tongue) or lasts longer than 24 hours.  8. You may have a dry mouth, muscle aches, trouble sleeping or a sore throat.  These symptoms should go away after 24 hours.  9. Do not make important or legal decisions.   Pain Management:      1. Take pain medication (if prescribed) for pain as directed by your physician.        2. WARNING: If the pain medication you have been prescribed contains Tylenol (acetaminophen), DO NOT take additional doses of Tylenol (acetaminophen).     Call your doctor for any of the followin.  Signs of infection (fever, growing tenderness at the surgery site, severe pain, a large amount of drainage or bleeding, foul-smelling drainage, redness, swelling).    2.  It has been over 8 to 10 hours since surgery and you are still not able to urinate (pee).    3.  Headache for over 24 hours.    4.  Numbness, tingling or weakness the day after surgery (if you had spinal anesthesia).  To contact a doctor, call _____________________________________ or:      [phone] and ask for the Resident On Call for:          __________________________________________ (answered 24 hours a day)      Emergency Department:  [facility]  "Emergency Department: [phone]  [facility] Emergency Department: [phone]               Rev. 10/2014       Pending Results     Date and Time Order Name Status Description    11/13/2017 0812 Surgical pathology exam In process             Admission Information     Date & Time Provider Department Dept. Phone    11/13/2017 Merline Wellington MD [facility] PREOP/PHASE -[phone]      Your Vitals Were     Blood Pressure Temperature Respirations Height Weight Pulse Oximetry    95/63 98.1  F (36.7  C) (Oral) 12 1.626 m (5' 4\") 69.9 kg (154 lb 1.6 oz) 99%    BMI (Body Mass Index)                   26.45 kg/m2           MyChart Information     [facility] gives you secure access to your electronic health record. If you see a primary care provider, you can also send messages to your care team and make appointments. If you have questions, please call your primary care clinic.  If you do not have a primary care provider, please call [phone] and they will assist you.        Care EveryWhere ID     This is your Care EveryWhere ID. This could be used by other organizations to access your [facility] medical records  [id number]        Equal Access to Services     SHANDRA ELIAS AH: Hadii cristina Alicia, waaxda luprincessadaha, qaybta kaalmada adegueroyada, ashanti minaya. So [facility] [phone].    ATENCIÓN: Si habla español, tiene a heard disposición servicios gratuitos de asistencia lingüística. Llame al [phone].    We comply with applicable federal civil rights laws and [place] laws. We do not discriminate on the basis of race, color, national origin, age, disability, sex, sexual orientation, or gender identity.               Review of your medicines      START taking        Dose / Directions    HYDROcodone-acetaminophen 5-325 MG per tablet   Commonly known as:  NORCO   Used for:  Vulvar cyst        Dose:  1-2 tablet   Take 1-2 tablets by mouth every 4 hours as needed for other (Moderate to Severe Pain) "   Quantity:  10 tablet   Refills:  0         CONTINUE these medicines which have NOT CHANGED        Dose / Directions    fluticasone 50 MCG/ACT spray   Commonly known as:  FLONASE   Used for:  Atopic rhinitis        Dose:  1-2 spray   Spray 1-2 sprays into both nostrils daily   Quantity:  1 Bottle   Refills:  1       LORazepam 0.5 MG tablet   Commonly known as:  ATIVAN   Used for:  SHON (generalized anxiety disorder)        Dose:  0.5 mg   Take 1 tablet (0.5 mg) by mouth as needed for anxiety   Quantity:  10 tablet   Refills:  0       vitamin D 2000 UNITS tablet        Dose:  1 tablet   Take 1 tablet by mouth daily   Refills:  0            Where to get your medicines      Some of these will need a paper prescription and others can be bought over the counter. Ask your nurse if you have questions.     Bring a paper prescription for each of these medications     HYDROcodone-acetaminophen 5-325 MG per tablet                Protect others around you: Learn how to safely use, store and throw away your medicines at www.disposemymeds.org.             Medication List: This is a list of all your medications and when to take them. Check marks below indicate your daily home schedule. Keep this list as a reference.      Medications           Morning Afternoon Evening Bedtime As Needed    fluticasone 50 MCG/ACT spray   Commonly known as:  FLONASE   Spray 1-2 sprays into both nostrils daily                                HYDROcodone-acetaminophen 5-325 MG per tablet   Commonly known as:  NORCO   Take 1-2 tablets by mouth every 4 hours as needed for other (Moderate to Severe Pain)                                LORazepam 0.5 MG tablet   Commonly known as:  ATIVAN   Take 1 tablet (0.5 mg) by mouth as needed for anxiety                                vitamin D 2000 UNITS tablet   Take 1 tablet by mouth daily

## 2017-11-13 NOTE — ANESTHESIA CARE TRANSFER NOTE
Patient: Billie Tavarez    Procedure(s):  Left Labium Excision Of Vulvar Cyst  - Wound Class: II-Clean Contaminated    Diagnosis: Symptomatic Vulvar Cyst Left Labium   Diagnosis Additional Information: No value filed.    Anesthesia Type:   MAC     Note:  Airway :Room Air  Patient transferred to:Phase II  Comments: Arrived in phase 2, report to RN, vitals stable, patient comfortable.  Handoff Report: Identifed the Patient, Identified the Reponsible Provider, Reviewed the pertinent medical history, Discussed the surgical course, Reviewed Intra-OP anesthesia mangement and issues during anesthesia, Set expectations for post-procedure period and Allowed opportunity for questions and acknowledgement of understanding      Vitals: (Last set prior to Anesthesia Care Transfer)    CRNA VITALS  11/13/2017 0759 - 11/13/2017 0838      11/13/2017             Pulse: 84    SpO2: 99 %                Electronically Signed By: KIMBERLY Minor CRNA  November 13, 2017  8:38 AM

## 2017-11-13 NOTE — ANESTHESIA POSTPROCEDURE EVALUATION
Patient: Billie Tavarez    Procedure(s):  Left Labium Excision Of Vulvar Cyst  - Wound Class: II-Clean Contaminated    Diagnosis:Symptomatic Vulvar Cyst Left Labium   Diagnosis Additional Information: No value filed.    Anesthesia Type:  MAC    Note:  Anesthesia Post Evaluation    Patient location during evaluation: PACU  Patient participation: Able to fully participate in evaluation  Level of consciousness: awake  Pain management: adequate  Airway patency: patent  Cardiovascular status: acceptable  Respiratory status: acceptable  Hydration status: acceptable  PONV: none             Last vitals:  Vitals:    11/13/17 0845 11/13/17 0900 11/13/17 0915   BP: 95/63 105/67 112/67   Resp: 12 12 12   Temp:   36.7  C (98.1  F)   SpO2: 99% 99% 99%         Electronically Signed By: Yahir Troncoso MD  November 13, 2017  11:59 AM

## 2017-11-13 NOTE — DISCHARGE INSTRUCTIONS
Same-Day Surgery   Adult Discharge Orders & Instructions     For 24 hours after surgery:  1. Get plenty of rest.  A responsible adult must stay with you for at least 24 hours after you leave the hospital.   2. Pain medication can slow your reflexes. Do not drive or use heavy equipment.  If you have weakness or tingling, don't drive or use heavy equipment until this feeling goes away.  3. Mixing alcohol and pain medication can cause dizziness and slow your breathing. It can even be fatal. Do not drink alcohol while taking pain medication.  4. Avoid strenuous or risky activities.  Ask for help when climbing stairs.   5. You may feel lightheaded.  If so, sit for a few minutes before standing.  Have someone help you get up.   6. If you have nausea (feel sick to your stomach), drink only clear liquids such as apple juice, ginger ale, broth or 7-Up.  Rest may also help.  Be sure to drink enough fluids.  Move to a regular diet as you feel able. Take pain medications with a small amount of solid food, such as toast or crackers, to avoid nausea.   7. A slight fever is normal. Call the doctor if your fever is over 100 F (37.7 C) (taken under the tongue) or lasts longer than 24 hours.  8. You may have a dry mouth, muscle aches, trouble sleeping or a sore throat.  These symptoms should go away after 24 hours.  9. Do not make important or legal decisions.   Pain Management:      1. Take pain medication (if prescribed) for pain as directed by your physician.        2. WARNING: If the pain medication you have been prescribed contains Tylenol (acetaminophen), DO NOT take additional doses of Tylenol (acetaminophen).     Call your doctor for any of the followin.  Signs of infection (fever, growing tenderness at the surgery site, severe pain, a large amount of drainage or bleeding, foul-smelling drainage, redness, swelling).    2.  It has been over 8 to 10 hours since surgery and you are still not able to urinate (pee).    3.   Headache for over 24 hours.    4.  Numbness, tingling or weakness the day after surgery (if you had spinal anesthesia).  To contact a doctor, call _____________________________________ or:      [phone] and ask for the Resident On Call for:          __________________________________________ (answered 24 hours a day)      Emergency Department:  [facility]: [phone]  [place] [facility]: [phone]               Rev. 10/2014

## 2017-11-20 LAB — COPATH REPORT: NORMAL

## 2017-11-29 ENCOUNTER — OFFICE VISIT (OUTPATIENT)
Dept: OBGYN | Facility: CLINIC | Age: 47
End: 2017-11-29
Payer: COMMERCIAL

## 2017-11-29 DIAGNOSIS — Z01.419 ENCOUNTER FOR GYNECOLOGICAL EXAMINATION WITHOUT ABNORMAL FINDING: Primary | ICD-10-CM

## 2017-11-29 PROCEDURE — 99396 PREV VISIT EST AGE 40-64: CPT | Performed by: OBSTETRICS & GYNECOLOGY

## 2017-11-29 RX ORDER — CYCLOBENZAPRINE HCL 5 MG
5 TABLET ORAL
COMMUNITY
Start: 2017-11-15 | End: 2018-02-21

## 2017-11-29 NOTE — PROGRESS NOTES
Billie is a 47 year old  female who presents for annual exam.     Menses are rare and oligomenorrheic lasting 2 days.  Menses flow: light.  No LMP recorded. Patient is not currently having periods (Reason: UNKNOWN).. Using none for contraception.  She is not currently considering pregnancy. Feels her last actual period was October.   Besides routine health maintenance, she has no other health concerns today. Recently had breast reduction and abdominoplasty.  GYNECOLOGIC HISTORY:  Menarche: 13      Billie is sexually active with 1male partner(s) and is currently in monogamous relationship.    History sexually transmitted infections:No STD history  STI testing offered?  Declined  MARIANNA exposure: No  History of abnormal Pap smear: NO - age 30-65 PAP every 5 years with negative HPV co-testing recommended  Family history of breast CA: No  Family history of uterine/ovarian CA: No    Family history of colon CA: No    HEALTH MAINTENANCE:  Cholesterol: (  Cholesterol   Date Value Ref Range Status   12/10/2015 189 <200 mg/dL Final   2013 172 0 - 200 mg/dL Final     Comment:     LDL Cholesterol is the primary guide to therapy.   The NCEP recommends further evaluation of: patients with cholesterol greater   than 200 mg/dL if additional risk factors are present, cholesterol greater   than   240 mg/dL, triglycerides greater than 150 mg/dL, or HDL less than 40 mg/dL.    History of abnormal lipids: No  Mammo: 2016 . History of abnormal Mammo: No.  Regular Self Breast Exams: yes  Calcium/Vitamin D intake: source:  diet Adequate? Yes  TSH: (  TSH   Date Value Ref Range Status   2013 0.83 0.4 - 5.0 mU/L Final    )  Pap; (  Lab Results   Component Value Date    PAP NIL 12/10/2015    )    HISTORY:  Obstetric History       T0      L3     SAB0   TAB0   Ectopic0   Multiple0   Live Births0       # Outcome Date GA Lbr Nadeem/2nd Weight Sex Delivery Anes PTL Lv   3 Para            2 Para            1 Para                  Past Medical History:   Diagnosis Date     Anxiety      Gluten intolerance      IBS (irritable bowel syndrome)      Rheumatoid arthritis(714.0)     Abstracted 5/24/02.     Rosacea      Past Surgical History:   Procedure Laterality Date     ABDOMINOPLASTY  11/15/2017     ARTHROPLASTY HIP      R     ENHANCE LASER REFRACTIVE EXISTING PT IN PARAMETERS Right 2/20/2017    Procedure: ENHANCE LASER REFRACTIVE EXISTING PT IN PARAMETERS;  Surgeon: Leandro Martinez MD;  [facility]:  [facility]     EXCISE MASS VULVA Left 11/13/2017    Procedure: EXCISE MASS VULVA;  Left Labium Excision Of Vulvar Cyst ;  Surgeon: Merline Wellington MD;  Location: [facility]     LASIK Left 1/18/2016    Procedure: LASIK;  Surgeon: Leandro Martinez MD;  Location:  [facility]     LASIK CUSTOMVUE Right 1/9/2017    Procedure: LASIK CUSTOMVUE;  Surgeon: Leandro Martinez MD;  [facility]:  [facility]     MAMMOPLASTY REDUCTION  11/15/2017    pathology was negative      TONSILLECTOMY       TUBAL LIGATION       WAVESCAN SCREENING Left 1/18/2016    Procedure: WAVESCAN SCREENING;  Surgeon: Leandro Martinez MD;  [facility]:  [facility]     WAVESCAN SCREENING Right 1/9/2017    Procedure: WAVESCAN SCREENING;  Surgeon: Leandro Martinez MD;  [facility]:  [facility]     WAVESCAN SCREENING Right 2/20/2017    Procedure: WAVESCAN SCREENING;  Surgeon: Leandro Martinez MD;  Location:  [facility]     Family History   Problem Relation Age of Onset     DIABETES Mother      Hypertension Mother      C.A.D. Mother      2 stents     Arthritis Maternal Grandmother      osteo     C.A.D. Paternal Grandfather 47     MENTAL ILLNESS Daughter      Mood Disorder     C.A.D. Paternal Uncle      Social History     Social History     Marital status:      Spouse name: N/A     Number of children: N/A     Years of education: N/A     Social History Main Topics     Smoking status: Former Smoker     Packs/day: 0.50     Years: 6.00     Types: Cigarettes     Quit date: 3/3/1995     Smokeless tobacco: Never Used      Comment:  quit      Alcohol use 2.0 oz/week     4 Standard drinks or equivalent per week      Comment: socially     Drug use: No     Sexual activity: Yes     Partners: Male     Birth control/ protection: Pill     Other Topics Concern     Not on file     Social History Narrative    , Works as nurse at [employer]            Regular menses    Last pap: <1 yr ago, remote abnormal    Last mammo: within last year, dense breasts       Current Outpatient Prescriptions:      cyclobenzaprine (FLEXERIL) 5 MG tablet, Take 5 mg by mouth, Disp: , Rfl:      LORazepam (ATIVAN) 0.5 MG tablet, Take 1 tablet (0.5 mg) by mouth as needed for anxiety, Disp: 10 tablet, Rfl: 0     fluticasone (FLONASE) 50 MCG/ACT nasal spray, Spray 1-2 sprays into both nostrils daily, Disp: 1 Bottle, Rfl: 1     Cholecalciferol (VITAMIN D) 2000 UNITS tablet, Take 1 tablet by mouth daily, Disp: , Rfl:      HYDROcodone-acetaminophen (NORCO) 5-325 MG per tablet, Take 1-2 tablets by mouth every 4 hours as needed for other (Moderate to Severe Pain) (Patient not taking: Reported on 2017), Disp: 10 tablet, Rfl: 0     Allergies   Allergen Reactions     Sulfa Drugs Other (See Comments)     Anaphylaxis- hives, nausea, vomiting       Past medical, surgical, social and family history were reviewed and updated in EPIC.    ROS:   C:     NEGATIVE for fever, chills, change in weight  I:       NEGATIVE for worrisome rashes, moles or lesions  E:     NEGATIVE for vision changes or irritation  E/M: NEGATIVE for ear, mouth and throat problems  R:     NEGATIVE for significant cough or SOB  CV:   NEGATIVE for chest pain, palpitations or peripheral edema  GI:     NEGATIVE for nausea, abdominal pain, heartburn, or change in bowel habits  :   NEGATIVE for frequency, dysuria, hematuria, vaginal discharge, or irregular bleeding  M:     NEGATIVE for significant arthralgias or myalgia  N:      NEGATIVE for weakness, dizziness or paresthesias  E:      NEGATIVE for temperature  intolerance, skin/hair changes  P:      NEGATIVE for changes in mood or affect.    EXAM:  There were no vitals taken for this visit.   BMI: There is no height or weight on file to calculate BMI.  Constitutional: healthy, alert and no distress  Head: Normocephalic. No masses, lesions, tenderness or abnormalities  Neck: Neck supple. Trachea midline. No adenopathy. Thyroid symmetric, normal size.   Cardiovascular: RRR.   Respiratory: Negative.   Breast: Breasts notable for healing surgical incisions.   Gastrointestinal: Abdomen soft, non-tender, non-distended. Healing abdominoplasty  :  Vulva:  No external lesions, normal female hair distribution, no inguinal adenopathy.  Healed vulvar incision. Stitch trimmed.   Urethra:  Midline, non-tender, well supported, no discharge  Vagina:  Moist, pink, no abnormal discharge, no lesions  Rectal Exam: deferred  Musculoskeletal: extremities normal  Skin: no suspicious lesions or rashes  Psychiatric: Affect appropriate, cooperative,mentation appears normal.     COUNSELING:   Reviewed preventive health counseling, as reflected in patient instructions       Regular exercise       Healthy diet/nutrition   reports that she quit smoking about 22 years ago. Her smoking use included Cigarettes. She has a 3.00 pack-year smoking history. She has never used smokeless tobacco.    There is no height or weight on file to calculate BMI.      FRAX Risk Assessment    ASSESSMENT:  47 year old female with satisfactory annual exam  (Z01.419) Encounter for gynecological examination without abnormal finding  (primary encounter diagnosis)  Comment:   Plan: Pap due in 3 years.  Lipids next year.

## 2017-11-29 NOTE — NURSING NOTE
"Chief Complaint   Patient presents with     Physical     no period since last phone conversation.        Initial There were no vitals taken for this visit. Estimated body mass index is 26.45 kg/(m^2) as calculated from the following:    Height as of 17: 5' 4\" (1.626 m).    Weight as of 17: 154 lb 1.6 oz (69.9 kg).  BP completed using cuff size: regular        The following HM Due: NONE      The following patient reported/Care Every where data was sent to:  P ABSTRACT QUALITY INITIATIVES [[id number]]        patient has appointment for today    Micaela Connor CMA                "

## 2017-11-29 NOTE — MR AVS SNAPSHOT
After Visit Summary   11/29/2017    Billie Tavraez    MRN: [id number]           Patient Information     Date Of Birth          1970        Visit Information        Provider Department      11/29/2017 11:30 Sona Curiel MD [facility]        Today's Diagnoses     Encounter for gynecological examination without abnormal finding    -  1       Follow-ups after your visit        Who to contact     If you have questions or need follow up information about today's clinic visit or your schedule please contact [facility] directly at [phone].  Normal or non-critical lab and imaging results will be communicated to you by Opezhart, letter or phone within 4 business days after the clinic has received the results. If you do not hear from us within 7 days, please contact the clinic through [employer] or phone. If you have a critical or abnormal lab result, we will notify you by phone as soon as possible.  Submit refill requests through AppCard or call your pharmacy and they will forward the refill request to us. Please allow 3 business days for your refill to be completed.          Additional Information About Your Visit        MyChart Information     AppCard gives you secure access to your electronic health record. If you see a primary care provider, you can also send messages to your care team and make appointments. If you have questions, please call your primary care clinic.  If you do not have a primary care provider, please call [phone] and they will assist you.        Care EveryWhere ID     This is your Care EveryWhere ID. This could be used by other organizations to access your [place] medical records  [id number]         Blood Pressure from Last 3 Encounters:   11/13/17 112/67   11/02/17 102/62   10/02/17 104/64    Weight from Last 3 Encounters:   11/13/17 154 lb 1.6 oz (69.9 kg)   11/02/17 156 lb 3.2 oz (70.9 kg)   10/02/17 163 lb 4.8 oz (74.1 kg)               Today, you had the following     No orders found for display       Primary Care Provider Office Phone # Fax #    Davey Arteaga PA-C [phone]       [address]  [facility] [address]        Equal Access to Services     SHANDRA ELIAS : Hadii aad ku hadjohno Soomaali, waaxda luqadaha, qaybta kaalmada adeegyada, waxtrever idiin hayzacharyn adeguero lloyd lamarinacayla . So [facility] [phone].    ATENCIÓN: Si habla español, tiene a heard disposición servicios gratuitos de asistencia lingüística. Llame al [phone].    We comply with applicable federal civil rights laws and [place] laws. We do not discriminate on the basis of race, color, national origin, age, disability, sex, sexual orientation, or gender identity.            Thank you!     Thank you for choosing [facility]  for your care. Our goal is always to provide you with excellent care. Hearing back from our patients is one way we can continue to improve our services. Please take a few minutes to complete the written survey that you may receive in the mail after your visit with us. Thank you!             Your Updated Medication List - Protect others around you: Learn how to safely use, store and throw away your medicines at www.disposemymeds.org.          This list is accurate as of: 11/29/17  1:17 PM.  Always use your most recent med list.                   Brand Name Dispense Instructions for use Diagnosis    cyclobenzaprine 5 MG tablet    FLEXERIL     Take 5 mg by mouth        fluticasone 50 MCG/ACT spray    FLONASE    1 Bottle    Spray 1-2 sprays into both nostrils daily    Atopic rhinitis       HYDROcodone-acetaminophen 5-325 MG per tablet    NORCO    10 tablet    Take 1-2 tablets by mouth every 4 hours as needed for other (Moderate to Severe Pain)    Vulvar cyst       LORazepam 0.5 MG tablet    ATIVAN    10 tablet    Take 1 tablet (0.5 mg) by mouth as needed for anxiety    SHON (generalized anxiety disorder)        vitamin D 2000 UNITS tablet      Take 1 tablet by mouth daily

## 2017-12-26 ENCOUNTER — MYC MEDICAL ADVICE (OUTPATIENT)
Dept: FAMILY MEDICINE | Facility: CLINIC | Age: 47
End: 2017-12-26

## 2017-12-28 NOTE — TELEPHONE ENCOUNTER
"Generally for Rae Rico Hepatitis A and typhoid vaccines are recommended. I would recommend that they schedule a visit with Lynda to discuss and see if any abx are recommended. We dont usually prescribe abx for \"just in case\" sinus infections, uti etc but occasionally do for travelers diarrhea. They should discuss with Lynda. Thanks!  "

## 2018-01-29 ENCOUNTER — TELEPHONE (OUTPATIENT)
Dept: OBGYN | Facility: CLINIC | Age: 48
End: 2018-01-29

## 2018-01-29 DIAGNOSIS — N89.8 VAGINAL ODOR: Primary | ICD-10-CM

## 2018-01-29 RX ORDER — METRONIDAZOLE 500 MG/1
500 TABLET ORAL 2 TIMES DAILY
Qty: 14 TABLET | Refills: 0 | Status: SHIPPED | OUTPATIENT
Start: 2018-01-29 | End: 2018-02-21

## 2018-01-29 NOTE — TELEPHONE ENCOUNTER
Pt prefers flagyl. Rx sent.  She will schedule visit when she is back in town.  Irene Barrow RN

## 2018-01-29 NOTE — TELEPHONE ENCOUNTER
"When pt was called back, she mentioned that she thinks she could possibly have BV.  It feels similar to when she had BV a few months ago.  She mentioned that she maybe didn't clean her \"personal device\" very well and that maybe that caused some of her symptoms?  She is out of town in [place] for another week and wonders if you would treat her for BV to see if it helps and then when she's back next week she could schedule an appointment?  Irene Barrow RN      "

## 2018-01-29 NOTE — TELEPHONE ENCOUNTER
No, I would also wonder about retained tampon, but would think there would be discharge between periods too.  She can see me during period and we can do endometrial biopsy or other assessment.

## 2018-01-29 NOTE — TELEPHONE ENCOUNTER
"Pt has noticed for the last 2 periods, she has had a really bad odor with her bleeding that lasts the entire period.  Says it smells like \"death.\"  She does not think that she forgot a tampon inside at any time.  The odor is not noticeable when she doesn't have her period.  Do you have any recommendations for her?  Irene Barrow, RN      "

## 2018-02-21 ENCOUNTER — OFFICE VISIT (OUTPATIENT)
Dept: OBGYN | Facility: CLINIC | Age: 48
End: 2018-02-21
Payer: COMMERCIAL

## 2018-02-21 DIAGNOSIS — N76.0 BV (BACTERIAL VAGINOSIS): Primary | ICD-10-CM

## 2018-02-21 DIAGNOSIS — N89.8 VAGINAL ITCHING: ICD-10-CM

## 2018-02-21 DIAGNOSIS — B96.89 BV (BACTERIAL VAGINOSIS): Primary | ICD-10-CM

## 2018-02-21 LAB
SPECIMEN SOURCE: NORMAL
WET PREP SPEC: NORMAL

## 2018-02-21 PROCEDURE — 99214 OFFICE O/P EST MOD 30 MIN: CPT | Performed by: OBSTETRICS & GYNECOLOGY

## 2018-02-21 PROCEDURE — 87210 SMEAR WET MOUNT SALINE/INK: CPT | Performed by: OBSTETRICS & GYNECOLOGY

## 2018-02-21 RX ORDER — METRONIDAZOLE 500 MG/1
500 TABLET ORAL 2 TIMES DAILY
Qty: 14 TABLET | Refills: 0 | Status: SHIPPED | OUTPATIENT
Start: 2018-02-21 | End: 2018-03-08

## 2018-02-21 NOTE — MR AVS SNAPSHOT
After Visit Summary   2/21/2018    Billie Tavarez    MRN: [id number]           Patient Information     Date Of Birth          1970        Visit Information        Provider Department      2/21/2018 12:45 PM Billie Sales MD [facility]        Today's Diagnoses     BV (bacterial vaginosis)    -  1    Vaginal itching           Follow-ups after your visit        Your next 10 appointments already scheduled     Mar 08, 2018  8:20 AM CST   Office Visit with Davey Arteaga PA-C   [facility] ([address] [address] [phone]           Bring a current list of meds and any records pertaining to this visit. For Physicals, please bring immunization records and any forms needing to be filled out. Please arrive 10 minutes early to complete paperwork.            Mar 16, 2018  9:30 AM CDT   Return Visit with Janae Kinney PA-C   [facility]    [address] [address] [phone]              Who to contact     If you have questions or need follow up information about today's clinic visit or your schedule please contact [facility] directly at [phone].  Normal or non-critical lab and imaging results will be communicated to you by MyChart, letter or phone within 4 business days after the clinic has received the results. If you do not hear from us within 7 days, please contact the clinic through MyChart or phone. If you have a critical or abnormal lab result, we will notify you by phone as soon as possible.  Submit refill requests through [employer] or call your pharmacy and they will forward the refill request to us. Please allow 3 business days for your refill to be completed.          Additional Information About Your Visit        [employer] Information     [employer] gives you secure access to your  electronic health record. If you see a primary care provider, you can also send messages to your care team and make appointments. If you have questions, please call your primary care clinic.  If you do not have a primary care provider, please call [phone] and they will assist you.        Care EveryWhere ID     This is your Care EveryWhere ID. This could be used by other organizations to access your [place] medical records  [id number]        Your Vitals Were     Last Period                   01/26/2018            Blood Pressure from Last 3 Encounters:   11/13/17 112/67   11/02/17 102/62   10/02/17 104/64    Weight from Last 3 Encounters:   11/13/17 154 lb 1.6 oz (69.9 kg)   11/02/17 156 lb 3.2 oz (70.9 kg)   10/02/17 163 lb 4.8 oz (74.1 kg)              We Performed the Following     Wet prep          Today's Medication Changes          These changes are accurate as of 2/21/18  1:38 PM.  If you have any questions, ask your nurse or doctor.               Stop taking these medicines if you haven't already. Please contact your care team if you have questions.     cyclobenzaprine 5 MG tablet   Commonly known as:  FLEXERIL   Stopped by:  Billie Sales MD                Where to get your medicines      These medications [facility] [address]  [address] [facility] [address]     Phone:  [phone]     metroNIDAZOLE 500 MG tablet                Primary Care Provider Office Phone # Fax #    Davey Arteaga PA-C [phone] [address] [facility] [address]        Equal Access to Services     [facility]: Hadii aad ku hadasho Soomaali, waaxda luqadaha, qaybta kaalmada edwardoyarobel, ashanti minaya. So [facility] [phone].    ATENCIÓN: Si habla español, tiene a heard disposición servicios gratuitos de asistencia lingüística. Llame al [phone].    We comply with applicable federal civil rights laws  and [place] laws. We do not discriminate on the basis of race, color, national origin, age, disability, sex, sexual orientation, or gender identity.            Thank you!     Thank you for choosing [facility]  for your care. Our goal is always to provide you with excellent care. Hearing back from our patients is one way we can continue to improve our services. Please take a few minutes to complete the written survey that you may receive in the mail after your visit with us. Thank you!             Your Updated Medication List - Protect others around you: Learn how to safely use, store and throw away your medicines at www.disposemymeds.org.          This list is accurate as of 2/21/18  1:38 PM.  Always use your most recent med list.                   Brand Name Dispense Instructions for use Diagnosis    fluticasone 50 MCG/ACT spray    FLONASE    1 Bottle    Spray 1-2 sprays into both nostrils daily    Atopic rhinitis       HYDROcodone-acetaminophen 5-325 MG per tablet    NORCO    10 tablet    Take 1-2 tablets by mouth every 4 hours as needed for other (Moderate to Severe Pain)    Vulvar cyst       LORazepam 0.5 MG tablet    ATIVAN    10 tablet    Take 1 tablet (0.5 mg) by mouth as needed for anxiety    SHON (generalized anxiety disorder)       metroNIDAZOLE 500 MG tablet    FLAGYL    14 tablet    Take 1 tablet (500 mg) by mouth 2 times daily    BV (bacterial vaginosis)       vitamin D 2000 UNITS tablet      Take 1 tablet by mouth daily

## 2018-02-21 NOTE — PROGRESS NOTES
"CC: BV    HPI: Billie Tavarez is a 47 year old  who was diagnosed with BV several months ago.  Since then, she stopped douching and using feminine wipes.  Last month her period was lighter than normal, mostly old brown blood that smelled like \"death.\"  She reports a day or 2 of mild vaginal itching and then when the period started the smell was intolerable.  She was in [place], so called and received a rx for flagyl.  She took the complete course, symptoms improved by the end of her menses.  She was fine, but the last day or so she has been feeling that mild itching again and her period is due, so she is worried she is getting BV again.  She checked for a retained tampon and hasn't been able to feel anything.  No odor outside of her period.  She does feel like her periods have lightened over the past several months.      She is sexually active with 1 male partner, has had negative STD testing since being with him.  She denies vaginal d/c.  She denies f/c/n/v.    ROS:  C: NEGATIVE for fever, chills, change in weight  I: NEGATIVE for worrisome rashes, moles or lesions  E: NEGATIVE for vision changes or irritation  E/M: NEGATIVE for ear, mouth and throat problems  R: NEGATIVE for significant cough or SOB  CV: NEGATIVE for chest pain, palpitations or peripheral edema  GI: NEGATIVE for nausea, abdominal pain, heartburn, or change in bowel habits  : NEGATIVE for frequency, dysuria, hematuria, vaginal discharge  M: NEGATIVE for significant arthralgias or myalgia  N: NEGATIVE for weakness, dizziness or paresthesias  E: NEGATIVE for temperature intolerance, skin/hair changes  P: NEGATIVE for changes in mood or affect    Past Medical History:   Diagnosis Date     Anxiety      Gluten intolerance      IBS (irritable bowel syndrome)      Rheumatoid arthritis(714.0)     Abstracted 02.     Rosacea      Past Surgical History:   Procedure Laterality Date     ABDOMINOPLASTY  11/15/2017     ARTHROPLASTY HIP      R "     ENHANCE LASER REFRACTIVE EXISTING PT IN PARAMETERS Right 2017    Procedure: ENHANCE LASER REFRACTIVE EXISTING PT IN PARAMETERS;  Surgeon: Leandro Martinez MD;  [facility]     EXCISE MASS VULVA Left 2017    Procedure: EXCISE MASS VULVA;  Left Labium Excision Of Vulvar Cyst ;  Surgeon: Merline Wellington MD;  [facility]     LASIK Left 2016    Procedure: LASIK;  Surgeon: Leandro Martinez MD;  Location: [facility]     LASIK CUSTOMVUE Right 2017    Procedure: LASIK CUSTOMVUE;  Surgeon: Leandro Martinez MD;  Location: [facility]     MAMMOPLASTY REDUCTION  11/15/2017    pathology was negative      TONSILLECTOMY       TUBAL LIGATION       WAVESCAN SCREENING Left 2016    Procedure: WAVESCAN SCREENING;  Surgeon: Leandro Martinez MD;  Location: [facility]     WAVESCAN SCREENING Right 2017    Procedure: WAVESCAN SCREENING;  Surgeon: Leandro Martinez MD;  Location: [facility]     WAVESCAN SCREENING Right 2017    Procedure: WAVESCAN SCREENING;  Surgeon: Leandro Martinez MD;  Location: [facility]     Obstetric History       T0      L3     SAB0   TAB0   Ectopic0   Multiple0   Live Births0       # Outcome Date GA Lbr Nadeem/2nd Weight Sex Delivery Anes PTL Lv   3 Para            2 Para            1 Para                    Allergies   Allergen Reactions     Sulfa Drugs Other (See Comments)     Anaphylaxis- hives, nausea, vomiting         Current Outpatient Prescriptions:      LORazepam (ATIVAN) 0.5 MG tablet, Take 1 tablet (0.5 mg) by mouth as needed for anxiety, Disp: 10 tablet, Rfl: 0     fluticasone (FLONASE) 50 MCG/ACT nasal spray, Spray 1-2 sprays into both nostrils daily, Disp: 1 Bottle, Rfl: 1     Cholecalciferol (VITAMIN D) 2000 UNITS tablet, Take 1 tablet by mouth daily, Disp: , Rfl:      HYDROcodone-acetaminophen (NORCO) 5-325 MG per tablet, Take 1-2 tablets by mouth every 4 hours as needed for other (Moderate to Severe Pain) (Patient not taking: Reported on 2017), Disp: 10 tablet,  "Rfl: 0    Social History     Social History     Marital status:      Spouse name: N/A     Number of children: N/A     Years of education: N/A     Occupational History     Not on file.     Social History Main Topics     Smoking status: Former Smoker     Packs/day: 0.50     Years: 6.00     Types: Cigarettes     Quit date: 3/3/1995     Smokeless tobacco: Never Used      Comment: quit      Alcohol use 0.0 - 2.4 oz/week     0 - 4 Standard drinks or equivalent per week      Comment: socially     Drug use: No     Sexual activity: Yes     Partners: Male     Birth control/ protection: Female Surgical     Other Topics Concern     Not on file     Social History Narrative    , Works as nurse at [employer]            Regular menses    Last pap: <1 yr ago, remote abnormal    Last mammo: within last year, dense breasts     Past medical, surgical, social and family history were reviewed and updated in EPIC.    PE: LMP 2018    Psych: normal affect, appropriate eye contact  Resp: no increased work of breathing  CV: no peripheral edema  Abd; SNT, no palpable masses  Lymph: no enlarged inquinal nodes  Pelvic: normal vulva and vagina, vaginal was small amount of old blood.  Scant white d/c.  Cervix without CMT  Skin: no visible rashes or lesions.    Wet prep: negative    A/P: vaginal odor   I explained wet prep is negative currently, would recommend monitoring symptoms during this next period as they may be improved.  She is leaving for [place] again tomorrow and is worried they will again become really bad.  She asked if I could prescribe her some flagyl \"just in case\", and I think it is reasonable.  I did encourage her to try to hold out if it is just odor and see if it resolves spontaneously with the completion of her menses.  Questions answered.    GREG LEMUS MD      "

## 2018-03-08 ENCOUNTER — OFFICE VISIT (OUTPATIENT)
Dept: FAMILY MEDICINE | Facility: CLINIC | Age: 48
End: 2018-03-08
Payer: COMMERCIAL

## 2018-03-08 VITALS
TEMPERATURE: 98.1 F | SYSTOLIC BLOOD PRESSURE: 100 MMHG | DIASTOLIC BLOOD PRESSURE: 64 MMHG | HEIGHT: 64 IN | HEART RATE: 85 BPM | OXYGEN SATURATION: 98 % | RESPIRATION RATE: 19 BRPM

## 2018-03-08 DIAGNOSIS — F41.1 GAD (GENERALIZED ANXIETY DISORDER): ICD-10-CM

## 2018-03-08 DIAGNOSIS — R25.3 MUSCLE TWITCHING: Primary | ICD-10-CM

## 2018-03-08 PROCEDURE — 99213 OFFICE O/P EST LOW 20 MIN: CPT | Performed by: PHYSICIAN ASSISTANT

## 2018-03-08 RX ORDER — LORAZEPAM 1 MG/1
1 TABLET ORAL DAILY
Qty: 10 TABLET | Refills: 0 | Status: SHIPPED | OUTPATIENT
Start: 2018-03-08 | End: 2018-12-13

## 2018-03-08 ASSESSMENT — ANXIETY QUESTIONNAIRES
IF YOU CHECKED OFF ANY PROBLEMS ON THIS QUESTIONNAIRE, HOW DIFFICULT HAVE THESE PROBLEMS MADE IT FOR YOU TO DO YOUR WORK, TAKE CARE OF THINGS AT HOME, OR GET ALONG WITH OTHER PEOPLE: SOMEWHAT DIFFICULT
7. FEELING AFRAID AS IF SOMETHING AWFUL MIGHT HAPPEN: NOT AT ALL
1. FEELING NERVOUS, ANXIOUS, OR ON EDGE: SEVERAL DAYS
6. BECOMING EASILY ANNOYED OR IRRITABLE: SEVERAL DAYS
GAD7 TOTAL SCORE: 4
2. NOT BEING ABLE TO STOP OR CONTROL WORRYING: NOT AT ALL
3. WORRYING TOO MUCH ABOUT DIFFERENT THINGS: SEVERAL DAYS
5. BEING SO RESTLESS THAT IT IS HARD TO SIT STILL: SEVERAL DAYS

## 2018-03-08 ASSESSMENT — PATIENT HEALTH QUESTIONNAIRE - PHQ9: 5. POOR APPETITE OR OVEREATING: NOT AT ALL

## 2018-03-08 NOTE — MR AVS SNAPSHOT
After Visit Summary   3/8/2018    Billie Tavarez    MRN: [id number]           Patient Information     Date Of Birth          1970        Visit Information        Provider Department      3/8/2018 8:20 AM Davey Arteaga PA-C [facility]        Today's Diagnoses     Muscle twitching    -  1       Follow-ups after your visit        Your next 10 appointments already scheduled     Mar 16, 2018  9:30 AM CDT   Return Visit with Janae Kinney PA-C   [facility] ([facility])    [address] [address] [phone]              Who to contact     If you have questions or need follow up information about today's clinic visit or your schedule please contact [facility] directly at [phone].  Normal or non-critical lab and imaging results will be communicated to you by MyChart, letter or phone within 4 business days after the clinic has received the results. If you do not hear from us within 7 days, please contact the clinic through MyChart or phone. If you have a critical or abnormal lab result, we will notify you by phone as soon as possible.  Submit refill requests through [employer] or call your pharmacy and they will forward the refill request to us. Please allow 3 business days for your refill to be completed.          Additional Information About Your Visit        MyChart Information     [employer] gives you secure access to your electronic health record. If you see a primary care provider, you can also send messages to your care team and make appointments. If you have questions, please call your primary care clinic.  If you do not have a primary care provider, please call [phone] and they will assist you.        Care EveryWhere ID     This is your Care EveryWhere ID. This could be used by other organizations to access your [place] medical records  [id number]        Your Vitals  "Were     Pulse Temperature Respirations Height Pulse Oximetry       85 98.1  F (36.7  C) (Tympanic) 19 5' 4\" (1.626 m) 98%        Blood Pressure from Last 3 Encounters:   03/08/18 100/64   11/13/17 112/67   11/02/17 102/62    Weight from Last 3 Encounters:   11/13/17 154 lb 1.6 oz (69.9 kg)   11/02/17 156 lb 3.2 oz (70.9 kg)   10/02/17 163 lb 4.8 oz (74.1 kg)              Today, you had the following     No orders found for display         Today's Medication Changes          These changes are accurate as of 3/8/18  9:02 AM.  If you have any questions, ask your nurse or doctor.               Stop taking these medicines if you haven't already. Please contact your care team if you have questions.     HYDROcodone-acetaminophen 5-325 MG per tablet   Commonly known as:  NORCO   Stopped by:  Davey Arteaga PA-C                    Primary Care Provider Office Phone # Fax #    Davey Arteaga PA-C [phone]       [address] [facility] [address]        Equal Access to Services     [facility] CenterKEYUR AH: Hadii aad ku hadasho Soomaali, waaxda luqadaha, qaybta kaalmada adeegyada, waxay elizabethin hayaan edwardo lloyd lajody ah. So [facility] [phone].    ATENCIÓN: Si habla español, tiene a heard disposición servicios gratuitos de asistencia lingüística. Llame al [phone].    We comply with applicable federal civil rights laws and [place] laws. We do not discriminate on the basis of race, color, national origin, age, disability, sex, sexual orientation, or gender identity.            Thank you!     Thank you for choosing [facility]  for your care. Our goal is always to provide you with excellent care. Hearing back from our patients is one way we can continue to improve our services. Please take a few minutes to complete the written survey that you may receive in the mail after your visit with us. Thank you!             Your Updated Medication List - Protect others around you: Learn how to " safely use, store and throw away your medicines at www.disposemymeds.org.          This list is accurate as of 3/8/18  9:02 AM.  Always use your most recent med list.                   Brand Name Dispense Instructions for use Diagnosis    fluticasone 50 MCG/ACT spray    FLONASE    1 Bottle    Spray 1-2 sprays into both nostrils daily    Atopic rhinitis       LORazepam 0.5 MG tablet    ATIVAN    10 tablet    Take 1 tablet (0.5 mg) by mouth as needed for anxiety    SHON (generalized anxiety disorder)       vitamin D 2000 UNITS tablet      Take 1 tablet by mouth daily

## 2018-03-08 NOTE — PROGRESS NOTES
SUBJECTIVE:   Billie Tavarez is a 47 year old female who presents to clinic today for the following health issues:    Musculoskeletal problem/pain      Duration: Intermittent couple months     Description  Location: bilateral hands, index and middle finger.     Intensity:  mild, moderate    Accompanying signs and symptoms: twitching of fingers     History  Previous similar problem: no   Previous evaluation:  none    Precipitating or alleviating factors:  Trauma or overuse: no   Aggravating factors include: none    Therapies tried and outcome: nothing    -Patient presents with new onset finger twitching (left 2nd and 3rd) but has also noted her hand shake on occasion as well  -feels a twitching in the fingers  -noticed nearly every time in the left; very rare in the right  -First noting it when putting on nail polish  -Seems to come and go - happening every few weeks   -not lasting full day; more like minutes  -There is no problem with dexterity, strength  -no n/t  -There is no family hx of tremor or neurologic disease  -she is right hand dominant  -she takes little caffeine  -not using any otc stimulant      Problem list and histories reviewed & adjusted, as indicated.  Additional history: as documented    Patient Active Problem List   Diagnosis     Mild major depression (H)     CARDIOVASCULAR SCREENING; LDL GOAL LESS THAN 160     GERD (gastroesophageal reflux disease)     Atopic rhinitis     Family history of diabetes mellitus     Family history of coronary artery disease     Elevated rheumatoid factor     Obesity     Anxiety     Diarrhea     Colon polyp     Rash     Rosacea     Inflamed seborrheic keratosis     Dyschromia     Vitamin D deficiency     Past Surgical History:   Procedure Laterality Date     ABDOMINOPLASTY  11/15/2017     ARTHROPLASTY HIP      R     ENHANCE LASER REFRACTIVE EXISTING PT IN PARAMETERS Right 2/20/2017    Procedure: ENHANCE LASER REFRACTIVE EXISTING PT IN PARAMETERS;  Surgeon: [place],  "Leandro FOX MD;  [facility]     EXCISE MASS VULVA Left 11/13/2017    Procedure: EXCISE MASS VULVA;  Left Labium Excision Of Vulvar Cyst ;  Surgeon: Merline Wellington MD;  [facility]     LASIK Left 1/18/2016    Procedure: LASIK;  Surgeon: Leandro Martinez MD;  [facility]     LASIK CUSTOMVUE Right 1/9/2017    Procedure: LASIK CUSTOMVUE;  Surgeon: Leandro Martinez MD;  [facility]     MAMMOPLASTY REDUCTION  11/15/2017    pathology was negative      TONSILLECTOMY       TUBAL LIGATION       WAVESCAN SCREENING Left 1/18/2016    Procedure: WAVESCAN SCREENING;  Surgeon: Leandro Martinez MD;  [facility]     WAVESCAN SCREENING Right 1/9/2017    Procedure: WAVESCAN SCREENING;  Surgeon: Leandro Martinez MD;  [facility]     WAVESCAN SCREENING Right 2/20/2017    Procedure: WAVESCAN SCREENING;  Surgeon: Leandro Martinez MD;  Location: [facility]       Social History   Substance Use Topics     Smoking status: Former Smoker     Packs/day: 0.50     Years: 6.00     Types: Cigarettes     Quit date: 3/3/1995     Smokeless tobacco: Never Used      Comment: quit 1994     Alcohol use 0.0 - 2.4 oz/week     0 - 4 Standard drinks or equivalent per week      Comment: socially     Family History   Problem Relation Age of Onset     DIABETES Mother      Hypertension Mother      C.A.D. Mother      2 stents     Arthritis Maternal Grandmother      osteo     C.A.D. Paternal Grandfather 47     MENTAL ILLNESS Daughter      Mood Disorder     C.A.D. Paternal Uncle            Reviewed and updated as needed this visit by clinical staff       Reviewed and updated as needed this visit by Provider         ROS:  Constitutional, HEENT, cardiovascular, pulmonary, gi and gu systems are negative, except as otherwise noted.    OBJECTIVE:     /64 (BP Location: Right arm, Patient Position: Chair, Cuff Size: Adult Large)  Pulse 85  Temp 98.1  F (36.7  C) (Tympanic)  Resp 19  Ht 5' 4\" (1.626 m)  SpO2 98%  There is no height or weight " on file to calculate BMI.  GENERAL: healthy, alert and no distress  MS: no gross musculoskeletal defects noted, no edema  NEURO: Normal strength and tone, tremor not noted at rest or with intentioned movements; rapid alternating movements normal    Diagnostic Test Results:  none     ASSESSMENT/PLAN:   1. Muscle twitching  I do wonder if this is simple fatigue related. Possible essential/intention tremor? No family hx of neuromusclar disorder. We'll monitor for now; if worsening ok to consider neurology.    2. SHON (generalized anxiety disorder)  More effective with 2x.5mg dosing. Moving to 1mg tabs.  ok.  - LORazepam (ATIVAN) 1 MG tablet; Take 1 tablet (1 mg) by mouth daily  Dispense: 10 tablet; Refill: 0      Davey Arteaga PA-C  [facility]

## 2018-03-09 ASSESSMENT — PATIENT HEALTH QUESTIONNAIRE - PHQ9: SUM OF ALL RESPONSES TO PHQ QUESTIONS 1-9: 1

## 2018-03-09 ASSESSMENT — ANXIETY QUESTIONNAIRES: GAD7 TOTAL SCORE: 4

## 2018-03-16 ENCOUNTER — OFFICE VISIT (OUTPATIENT)
Dept: DERMATOLOGY | Facility: CLINIC | Age: 48
End: 2018-03-16
Payer: COMMERCIAL

## 2018-03-16 VITALS — OXYGEN SATURATION: 99 % | HEART RATE: 77 BPM | DIASTOLIC BLOOD PRESSURE: 77 MMHG | SYSTOLIC BLOOD PRESSURE: 109 MMHG

## 2018-03-16 DIAGNOSIS — D22.5 MELANOCYTIC NEVUS OF TRUNK: ICD-10-CM

## 2018-03-16 DIAGNOSIS — L82.0 INFLAMED SEBORRHEIC KERATOSIS: Primary | ICD-10-CM

## 2018-03-16 PROCEDURE — 99212 OFFICE O/P EST SF 10 MIN: CPT | Mod: 25 | Performed by: PHYSICIAN ASSISTANT

## 2018-03-16 PROCEDURE — 17110 DESTRUCTION B9 LES UP TO 14: CPT | Performed by: PHYSICIAN ASSISTANT

## 2018-03-16 NOTE — PROGRESS NOTES
HPI:   Billie Tavarez is a 47 year old female who presents for evaluation of moles on each side of the lower abdomen  chief complaint - seborrheic keratoses  Location: lower abdomen   Condition present for:  Years. Due to irritation she would like cryotherapy treatment.   She also complains of lesions on the back.    Additional history:  Melasma on face also treated with hydroquinone previously. Pleased with progress.    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 Her adult daughters have also been evaluated at this clinic ([facility]). Pt grew up in [place] and was exposed to sun.     This document serves as a record of the services and decisions personally performed and made by Janae Kinney PA-C. It was created on her behalf by Del Barnett, a trained medical scribe.  The creation of this document is based on the scribe's personal observations and the provider's statements to the medical scribe.  Del Barnett, March 16, 2018 9:44 AM      PHYSICAL EXAM:   /77  Pulse 77  SpO2 99%  Skin exam performed as follows: Type 2 skin. Mood appropriate  Alert and Oriented X 3. Well developed, well nourished in no distress.  General appearance: Normal  Head including face: Normal  Eyes: conjunctiva and lids: Normal  Mouth: Lips, teeth, gums: Normal  Neck: Normal  Chest-breast/axillae: Normal  Back: Normal  Spleen and liver: Normal  Cardiovascular: Exam of  peripheral vascular system by observation for swelling, varicosities, edema: Normal  Genitalia: groin, buttocks: Normal  Extremities: digits/nails (clubbing): Normal  Eccrine and Apocrine glands: Normal  Right upper extremity: Normal  Left upper extremity: Normal  Right lower extremity: Normal  Left lower extremity: Normal  Skin: Scalp and body hair: See below    1. Seborrheic keratosis on left iliac crest. Advised benign, no treatment needed.  2. Brown macule with even borders and pigmentation 4 mm on the right lower abdomen    ASSESSMENT/PLAN:     1. Inflamed seborrheic keratosis on the left iliac crest x 1. As physically tender cryosurgery performed. Advised on post op care.   2. Benign nevus. No treatment necessary.    Follow-up: prn/ q1 yr fse.  CC:   The information in this document, created by the medical scribe for me, accurately reflects the services I personally performed and the decisions made by me. I have reviewed and approved this document for accuracy prior to leaving the patient care area.  Janae Kinney PA-C March 16, 2018 9:44 AM

## 2018-03-16 NOTE — PATIENT INSTRUCTIONS
FUTURE APPOINTMENTS  Follow up annually in June 2018 if still in [facility] for a full-body skin cancer screening.    WOUND CARE INSTRUCTIONS  FOR CRYOSURGERY        This area treated with liquid nitrogen will form a blister. You do not need to bandage the area until after the blister forms and breaks (which may be a few days).  When the blister breaks, begin daily dressing changes as follows:    1) Clean and dry the area with tap water using clean Q-tip or sterile gauze pad.    2) Apply Polysporin ointment or Bacitracin ointment over entire wound.  Do NOT use Neosporin ointment.    3) Cover the wound with a band-aid or sterile non-stick gauze pad and micropore paper tape.      REPEAT THESE INSTRUCTIONS AT LEAST ONCE A DAY UNTIL THE WOUND HAS COMPLETELY HEALED.        It is an old wives tale that a wound heals better when it is exposed to air and allowed to dry out. The wound will heal faster with a better cosmetic result if it is kept moist with ointment and covered with a bandage.  Do not let the wound dry out.      IMPORTANT INFORMATION ON REVERSE SIDE    Supplies Needed:     *Cotton tipped applicators (Q-tips)   *Polysporin ointment or Bacitracin ointment (NOT NEOSPORIN)   *Band-aids, or non stick gauze pads and micropore paper tape                PATIENT INFORMATION    During the healing process you will notice a number of changes. All wounds develop a small halo of redness surrounding the wound.  This means healing is occurring. Severe itching with extensive redness usually indicates sensitivity to the ointment or bandage tape used to dress the wound.  You should call our office if this develops.      Swelling and/or discoloration around your surgical site is common, particularly when performed around the eye.    All wounds normally drain.  The larger the wound the more drainage there will be.  After 7-10 days, you will notice the wound beginning to shrink and new skin will begin to grow.  The wound is healed  when you can see skin has formed over the entire area.  A healed wound has a healthy, shiny look to the surface and is red to dark pink in color to normalize.  Wounds may take approximately 4-6 weeks to heal.  Larger wounds may take 6-8 weeks.  After the wound is healed you may discontinue dressing changes.    You may experience a sensation of tightness as your wound heals. This is normal and will gradually subside.    Your healed wound may be sensitive to temperature changes. This sensitivity improves with time, but if you re having a lot of discomfort, try to avoid temperature extremes.    Patients frequently experience itching after their wound appears to have healed because of the continue healing under the skin.  Plain Vaseline will help relieve the itching.

## 2018-03-16 NOTE — LETTER
3/16/2018         RE: Billie Tavarez  [address]  [facility] [address]        Dear Colleague,    Thank you for referring your patient, Billie Tavarez, to the [facility]. Please see a copy of my visit note below.    HPI:   Billie Tavarez is a 47 year old female who presents for evaluation of moles on each side of the lower abdomen  chief complaint - seborrheic keratoses  Location: lower abdomen   Condition present for:  Years. Due to irritation she would like cryotherapy treatment.   She also complains of lesions on the back.    Additional history:  Melasma on face also treated with hydroquinone previously. Pleased with progress.    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 Her adult daughters have also been evaluated at this clinic ([facility]). Pt grew up in [place] and was exposed to sun.     This document serves as a record of the services and decisions personally performed and made by Janae Kinney PA-C. It was created on her behalf by Del Barnett, a trained medical scribe.  The creation of this document is based on the scribe's personal observations and the provider's statements to the medical scribe.  Del Barnett, March 16, 2018 9:44 AM      PHYSICAL EXAM:   /77  Pulse 77  SpO2 99%  Skin exam performed as follows: Type 2 skin. Mood appropriate  Alert and Oriented X 3. Well developed, well nourished  in no distress.  General appearance: Normal  Head including face: Normal  Eyes: conjunctiva and lids: Normal  Mouth: Lips, teeth, gums: Normal  Neck: Normal  Chest-breast/axillae: Normal  Back: Normal  Spleen and liver: Normal  Cardiovascular: Exam of peripheral vascular system by observation for swelling, varicosities, edema: Normal  Genitalia: groin, buttocks: Normal  Extremities: digits/nails (clubbing): Normal  Eccrine and Apocrine glands: Normal  Right upper extremity: Normal  Left upper extremity: Normal  Right lower extremity: Normal  Left lower extremity: Normal  Skin: Scalp and body hair: See below    1. Seborrheic keratosis on left iliac crest. Advised benign, no treatment needed.  2. Brown macule with even borders and pigmentation 4 mm on the right lower abdomen    ASSESSMENT/PLAN:     1. Inflamed seborrheic keratosis on the left iliac crest x 1. As physically tender cryosurgery performed. Advised on post op care.   2. Benign nevus. No treatment necessary.    Follow-up: prn/ q1 yr fse.  CC:   The information in this document, created by the medical scribe for me, accurately reflects the services I personally performed and the decisions made by me. I have reviewed and approved this document for accuracy prior to leaving the patient care area.  Janae Kinney PA-C March 16, 2018 9:44 AM    Again, thank you for allowing me to participate in the care of your patient.        Sincerely,        Janae Kinney PA-C

## 2018-03-16 NOTE — NURSING NOTE
"Chief Complaint   Patient presents with     Derm Problem       Initial /77  Pulse 77  SpO2 99% Estimated body mass index is 26.45 kg/(m^2) as calculated from the following:    Height as of 11/13/17: 1.626 m (5' 4\").    Weight as of 11/13/17: 69.9 kg (154 lb 1.6 oz).  Medication Reconciliation: complete    "

## 2018-03-16 NOTE — MR AVS SNAPSHOT
After Visit Summary   3/16/2018    Billie Tavarez    MRN: [id number]           Patient Information     Date Of Birth          1970        Visit Information        Provider Department      3/16/2018 9:30 AM Janae Kinney PA-C [facility]        Today's Diagnoses     Inflamed seborrheic keratosis    -  1    Melanocytic nevus of trunk          Care Instructions    FUTURE APPOINTMENTS  Follow up annually in June 2018 if still in [facility] for a full-body skin cancer screening.    WOUND CARE INSTRUCTIONS  FOR CRYOSURGERY        This area treated with liquid nitrogen will form a blister. You do not need to bandage the area until after the blister forms and breaks (which may be a few days).  When the blister breaks, begin daily dressing changes as follows:    1) Clean and dry the area with tap water using clean Q-tip or sterile gauze pad.    2) Apply Polysporin ointment or Bacitracin ointment over entire wound.  Do NOT use Neosporin ointment.    3) Cover the wound with a band-aid or sterile non-stick gauze pad and micropore paper tape.      REPEAT THESE INSTRUCTIONS AT LEAST ONCE A DAY UNTIL THE WOUND HAS COMPLETELY HEALED.        It is an old wives tale that a wound heals better when it is exposed to air and allowed to dry out. The wound will heal faster with a better cosmetic result if it is kept moist with ointment and covered with a bandage.  Do not let the wound dry out.      IMPORTANT INFORMATION ON REVERSE SIDE    Supplies Needed:     *Cotton tipped applicators (Q-tips)   *Polysporin ointment or Bacitracin ointment (NOT NEOSPORIN)   *Band-aids, or non stick gauze pads and micropore paper tape                PATIENT INFORMATION    During the healing process you will notice a number of changes. All wounds develop a small halo of redness surrounding the wound.  This means healing is occurring. Severe itching with extensive redness usually indicates sensitivity to the  ointment or bandage tape used to dress the wound.  You should call our office if this develops.      Swelling and/or discoloration around your surgical site is common, particularly when performed around the eye.    All wounds normally drain.  The larger the wound the more drainage there will be.  After 7-10 days, you will notice the wound beginning to shrink and new skin will begin to grow.  The wound is healed when you can see skin has formed over the entire area.  A healed wound has a healthy, shiny look to the surface and is red to dark pink in color to normalize.  Wounds may take approximately 4-6 weeks to heal.  Larger wounds may take 6-8 weeks.  After the wound is healed you may discontinue dressing changes.    You may experience a sensation of tightness as your wound heals. This is normal and will gradually subside.    Your healed wound may be sensitive to temperature changes. This sensitivity improves with time, but if you re having a lot of discomfort, try to avoid temperature extremes.    Patients frequently experience itching after their wound appears to have healed because of the continue healing under the skin.  Plain Vaseline will help relieve the itching.          Follow-ups after your visit        Who to contact     If you have questions or need follow up information about today's clinic visit or your schedule please contact [facility] directly at [phone].  Normal or non-critical lab and imaging results will be communicated to you by MyChart, letter or phone within 4 business days after the clinic has received the results. If you do not hear from us within 7 days, please contact the clinic through MyChart or phone. If you have a critical or abnormal lab result, we will notify you by phone as soon as possible.  Submit refill requests through [employer] or call your pharmacy and they will forward the refill request to us. Please allow 3 business days for your refill to be  completed.          Additional Information About Your Visit        MyChart Information     [employer]On gives you secure access to your electronic health record. If you see a primary care provider, you can also send messages to your care team and make appointments. If you have questions, please call your primary care clinic.  If you do not have a primary care provider, please call [phone] and they will assist you.        Care EveryWhere ID     This is your Care EveryWhere ID. This could be used by other organizations to access your [facility] [place] medical records  [id number]        Your Vitals Were     Pulse Pulse Oximetry                77 99%           Blood Pressure from Last 3 Encounters:   03/16/18 109/77   03/08/18 100/64   11/13/17 112/67    Weight from Last 3 Encounters:   11/13/17 69.9 kg (154 lb 1.6 oz)   11/02/17 70.9 kg (156 lb 3.2 oz)   10/02/17 74.1 kg (163 lb 4.8 oz)              Today, you had the following     No orders found for display       Primary Care Provider Office Phone # Fax #    Davey Arteaga PA-C [phone] [address] [facility] [address]        Equal Access to Services     ANAI ELIAS : Hadii aad ku hadasho Soomaali, waaxda luqadaha, qaybta kaalmada adeegyada, waxay idiin hayaan adeeg gabino la'aan ah. So [facility] [phone].    ATENCIÓN: Si habla español, tiene a heard disposición servicios gratuitos de asistencia lingüística. Aaron al [phone].    We comply with applicable federal civil rights laws and [place] laws. We do not discriminate on the basis of race, color, national origin, age, disability, sex, sexual orientation, or gender identity.            Thank you!     Thank you for choosing [facility]  for your care. Our goal is always to provide you with excellent care. Hearing back from our patients is one way we can continue to improve our services. Please take a few minutes to complete the written survey that you may  receive in the mail after your visit with us. Thank you!             Your Updated Medication List - Protect others around you: Learn how to safely use, store and throw away your medicines at www.disposemymeds.org.          This list is accurate as of 3/16/18  9:52 AM.  Always use your most recent med list.                   Brand Name Dispense Instructions for use Diagnosis    fluticasone 50 MCG/ACT spray    FLONASE    1 Bottle    Spray 1-2 sprays into both nostrils daily    Atopic rhinitis       LORazepam 1 MG tablet    ATIVAN    10 tablet    Take 1 tablet (1 mg) by mouth daily    SHON (generalized anxiety disorder)       vitamin D 2000 UNITS tablet      Take 1 tablet by mouth daily

## 2018-03-21 ENCOUNTER — TELEPHONE (OUTPATIENT)
Dept: FAMILY MEDICINE | Facility: CLINIC | Age: 48
End: 2018-03-21

## 2018-03-21 DIAGNOSIS — J30.9 CHRONIC ALLERGIC RHINITIS, UNSPECIFIED SEASONALITY, UNSPECIFIED TRIGGER: ICD-10-CM

## 2018-03-21 RX ORDER — FLUTICASONE PROPIONATE 50 MCG
1-2 SPRAY, SUSPENSION (ML) NASAL DAILY
Qty: 3 BOTTLE | Refills: 1 | Status: SHIPPED | OUTPATIENT
Start: 2018-03-21 | End: 2018-12-13

## 2018-03-21 NOTE — TELEPHONE ENCOUNTER
Patient called and requested that she be prescribed a 3 month supply of Flonase.    -Inés Mcnair

## 2018-03-26 ENCOUNTER — OFFICE VISIT (OUTPATIENT)
Dept: FAMILY MEDICINE | Facility: CLINIC | Age: 48
End: 2018-03-26
Payer: COMMERCIAL

## 2018-03-26 VITALS
HEART RATE: 81 BPM | OXYGEN SATURATION: 99 % | DIASTOLIC BLOOD PRESSURE: 76 MMHG | TEMPERATURE: 98.9 F | RESPIRATION RATE: 18 BRPM | SYSTOLIC BLOOD PRESSURE: 115 MMHG | HEIGHT: 64 IN

## 2018-03-26 DIAGNOSIS — Z11.1 SCREENING FOR TUBERCULOSIS: ICD-10-CM

## 2018-03-26 DIAGNOSIS — Z78.9 HISTORY OF MEASLES, MUMPS, RUBELLA (MMR) VACCINATION UNKNOWN: ICD-10-CM

## 2018-03-26 DIAGNOSIS — Z11.59 SCREENING FOR MEASLES: ICD-10-CM

## 2018-03-26 DIAGNOSIS — Z78.9 VARICELLA VACCINATION STATUS UNKNOWN: ICD-10-CM

## 2018-03-26 DIAGNOSIS — Z78.9 RUBELLA IMMUNE STATUS NOT KNOWN: ICD-10-CM

## 2018-03-26 DIAGNOSIS — Z23 NEED FOR PROPHYLACTIC VACCINATION AND INOCULATION AGAINST INFLUENZA: Primary | ICD-10-CM

## 2018-03-26 DIAGNOSIS — Z11.59 NEED FOR HEPATITIS B SCREENING TEST: ICD-10-CM

## 2018-03-26 PROCEDURE — 99213 OFFICE O/P EST LOW 20 MIN: CPT | Mod: 25 | Performed by: PHYSICIAN ASSISTANT

## 2018-03-26 PROCEDURE — 87340 HEPATITIS B SURFACE AG IA: CPT | Performed by: PHYSICIAN ASSISTANT

## 2018-03-26 PROCEDURE — 86762 RUBELLA ANTIBODY: CPT | Performed by: PHYSICIAN ASSISTANT

## 2018-03-26 PROCEDURE — 86735 MUMPS ANTIBODY: CPT | Performed by: PHYSICIAN ASSISTANT

## 2018-03-26 PROCEDURE — 86706 HEP B SURFACE ANTIBODY: CPT | Performed by: PHYSICIAN ASSISTANT

## 2018-03-26 PROCEDURE — 86765 RUBEOLA ANTIBODY: CPT | Performed by: PHYSICIAN ASSISTANT

## 2018-03-26 PROCEDURE — 90686 IIV4 VACC NO PRSV 0.5 ML IM: CPT | Performed by: PHYSICIAN ASSISTANT

## 2018-03-26 PROCEDURE — 36415 COLL VENOUS BLD VENIPUNCTURE: CPT | Performed by: PHYSICIAN ASSISTANT

## 2018-03-26 PROCEDURE — 86480 TB TEST CELL IMMUN MEASURE: CPT | Performed by: PHYSICIAN ASSISTANT

## 2018-03-26 PROCEDURE — 90471 IMMUNIZATION ADMIN: CPT | Performed by: PHYSICIAN ASSISTANT

## 2018-03-26 PROCEDURE — 86787 VARICELLA-ZOSTER ANTIBODY: CPT | Performed by: PHYSICIAN ASSISTANT

## 2018-03-26 NOTE — MR AVS SNAPSHOT
After Visit Summary   3/26/2018    Billie Tavarez    MRN: [id number]           Patient Information     Date Of Birth          1970        Visit Information        Provider Department      3/26/2018 2:20 PM Davey Arteaga PA-C [facility]        Today's Diagnoses     Need for prophylactic vaccination and inoculation against influenza    -  1    Screening for measles        Rubella immune status not known        History of measles, mumps, rubella (MMR) vaccination unknown        Screening for tuberculosis        Varicella vaccination status unknown           Follow-ups after your visit        Who to contact     If you have questions or need follow up information about today's clinic visit or your schedule please contact [facility] directly at [phone].  Normal or non-critical lab and imaging results will be communicated to you by [employer], letter or phone within 4 business days after the clinic has received the results. If you do not hear from us within 7 days, please contact the clinic through [employer] or phone. If you have a critical or abnormal lab result, we will notify you by phone as soon as possible.  Submit refill requests through [employer] or call your pharmacy and they will forward the refill request to us. Please allow 3 business days for your refill to be completed.          Additional Information About Your Visit        MyChart Information     [employer] gives you secure access to your electronic health record. If you see a primary care provider, you can also send messages to your care team and make appointments. If you have questions, please call your primary care clinic.  If you do not have a primary care provider, please call [phone] and they will assist you.        Care EveryWhere ID     This is your Care EveryWhere ID. This could be used by other organizations to access your [place] medical records  [id number]        Your Vitals Were   "   Pulse Temperature Respirations Height Pulse Oximetry       81 98.9  F (37.2  C) (Tympanic) 18 5' 4\" (1.626 m) 99%        Blood Pressure from Last 3 Encounters:   03/26/18 115/76   03/16/18 109/77   03/08/18 100/64    Weight from Last 3 Encounters:   11/13/17 154 lb 1.6 oz (69.9 kg)   11/02/17 156 lb 3.2 oz (70.9 kg)   10/02/17 163 lb 4.8 oz (74.1 kg)              We Performed the Following     FLU VAC, SPLIT VIRUS IM > 3 YO (QUADRIVALENT) [54220]     M Tuberculosis by Quantiferon     Mumps Antibody IgG     Rubella Antibody IgG Quantitative     Rubeola Antibody IgG     Vaccine Administration, Initial [83862]     Varicella Zoster Virus Antibody IgG        Primary Care Provider Office Phone # Fax #    Davey Arteaga PA-C [phone] [address] [facility] [address]        Equal Access to Services     [facility]: Hadii aad ku hadasho Soomaali, waaxda luqadaha, qaybta kaalmada adeegyada, waxay idiin hayaan adeeg kharajuliette la'zacharyn . So [facility] [phone].    ATENCIÓN: Si habla español, tiene a heard disposición servicios gratuitos de asistencia lingüística. Llame al [phone].    We comply with applicable federal civil rights laws and [place] laws. We do not discriminate on the basis of race, color, national origin, age, disability, sex, sexual orientation, or gender identity.            Thank you!     Thank you for choosing [facility]  for your care. Our goal is always to provide you with excellent care. Hearing back from our patients is one way we can continue to improve our services. Please take a few minutes to complete the written survey that you may receive in the mail after your visit with us. Thank you!             Your Updated Medication List - Protect others around you: Learn how to safely use, store and throw away your medicines at www.disposemymeds.org.          This list is accurate as of 3/26/18  2:55 PM.  Always use your most recent med list.             "       Brand Name Dispense Instructions for use Diagnosis    fluticasone 50 MCG/ACT spray    FLONASE    3 Bottle    Spray 1-2 sprays into both nostrils daily    Chronic allergic rhinitis, unspecified seasonality, unspecified trigger       LORazepam 1 MG tablet    ATIVAN    10 tablet    Take 1 tablet (1 mg) by mouth daily    SHON (generalized anxiety disorder)       vitamin D 2000 UNITS tablet      Take 1 tablet by mouth daily

## 2018-03-26 NOTE — PROGRESS NOTES
SUBJECTIVE:   Bilile Tavarez is a 47 year old female who presents to clinic today for the following health issues:    Patient is here today because she needs titers for MMR Varicella and TB Gold.   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   Patient also needs a flu shot.   She feels well otherwise.     Problem list and histories reviewed & adjusted, as indicated.  Additional history: as documented    Patient Active Problem List   Diagnosis     Mild major depression (H)     CARDIOVASCULAR SCREENING; LDL GOAL LESS THAN 160     GERD (gastroesophageal reflux disease)     Atopic rhinitis     Family history of diabetes mellitus     Family history of coronary artery disease     Elevated rheumatoid factor     Obesity     Anxiety     Diarrhea     Colon polyp     Rash     Rosacea     Inflamed seborrheic keratosis     Dyschromia     Vitamin D deficiency     Past Surgical History:   Procedure Laterality Date     ABDOMINOPLASTY  11/15/2017     ARTHROPLASTY HIP      R     ENHANCE LASER REFRACTIVE EXISTING PT IN PARAMETERS Right 2/20/2017    Procedure: ENHANCE LASER REFRACTIVE EXISTING PT IN PARAMETERS;  Surgeon: Leandro Martinez MD;  Location:  [facility]     EXCISE MASS VULVA Left 11/13/2017    Procedure: EXCISE MASS VULVA;  Left Labium Excision Of Vulvar Cyst ;  Surgeon: Merline Wellington MD;  Location: [facility]     LASIK Left 1/18/2016    Procedure: LASIK;  Surgeon: Leandro Martinez MD;  Location:  [facility]     LASIK CUSTOMVUE Right 1/9/2017    Procedure: LASIK CUSTOMVUE;  Surgeon: Leandro Martinez MD;  Location:  [facility]     MAMMOPLASTY REDUCTION  11/15/2017    pathology was negative      TONSILLECTOMY       TUBAL LIGATION       WAVESCAN SCREENING Left 1/18/2016    Procedure: WAVESCAN SCREENING;  Surgeon: Leandro Martinez MD;  Location: [facility]     WAVESCAN SCREENING Right 1/9/2017    Procedure: WAVESCAN SCREENING;  Surgeon: Leandro Martinez MD;  Location: [facility]     WAVESCAN SCREENING Right 2/20/2017    Procedure:  "WAVESCAN SCREENING;  Surgeon: Leandro Martinez MD;  [facility]       Social History   Substance Use Topics     Smoking status: Former Smoker     Packs/day: 0.50     Years: 6.00     Types: Cigarettes     Quit date: 3/3/1995     Smokeless tobacco: Never Used      Comment: quit 1994     Alcohol use 0.0 - 2.4 oz/week     0 - 4 Standard drinks or equivalent per week      Comment: socially     Family History   Problem Relation Age of Onset     DIABETES Mother      Hypertension Mother      C.A.D. Mother      2 stents     Arthritis Maternal Grandmother      osteo     C.A.D. Paternal Grandfather 47     MENTAL ILLNESS Daughter      Mood Disorder     C.A.D. Paternal Uncle            Reviewed and updated as needed this visit by clinical staff       Reviewed and updated as needed this visit by Provider         ROS:  Constitutional, HEENT, cardiovascular, pulmonary, gi and gu systems are negative, except as otherwise noted.    OBJECTIVE:     /76 (BP Location: Right arm, Patient Position: Chair, Cuff Size: Adult Regular)  Pulse 81  Temp 98.9  F (37.2  C) (Tympanic)  Resp 18  Ht 5' 4\" (1.626 m)  SpO2 99%  There is no height or weight on file to calculate BMI.  GENERAL: healthy, alert and no distress  PSYCH: mentation appears normal, affect normal/bright    Diagnostic Test Results:  See A/P    ASSESSMENT/PLAN:     1. Need for prophylactic vaccination and inoculation against influenza  - FLU VAC, SPLIT VIRUS IM > 3 YO (QUADRIVALENT) [20824]  - Vaccine Administration, Initial [97201]    2. Screening for measles  - Rubeola Antibody IgG    3. Rubella immune status not known  - Rubella Antibody IgG Quantitative    4. History of measles, mumps, rubella (MMR) vaccination unknown  - Rubella Antibody IgG Quantitative  - Rubeola Antibody IgG  - Mumps Antibody IgG    5. Screening for tuberculosis  - M Tuberculosis by Quantiferon    6. Varicella vaccination status unknown  - Varicella Zoster Virus Antibody IgG      Davey" Denver Arteaga PA-C  [facility]    Injectable Influenza Immunization Documentation    1.  Is the person to be vaccinated sick today?   No    2. Does the person to be vaccinated have an allergy to a component   of the vaccine?   No  Egg Allergy Algorithm Link    3. Has the person to be vaccinated ever had a serious reaction   to influenza vaccine in the past?   No    4. Has the person to be vaccinated ever had Guillain-Barré syndrome?   No    Form completed by Sona Rodas MA

## 2018-03-27 ENCOUNTER — TELEPHONE (OUTPATIENT)
Dept: FAMILY MEDICINE | Facility: CLINIC | Age: 48
End: 2018-03-27

## 2018-03-27 DIAGNOSIS — Z11.59 NEED FOR HEPATITIS B SCREENING TEST: Primary | ICD-10-CM

## 2018-03-27 NOTE — TELEPHONE ENCOUNTER
Patient calls today, was seen yesterday and does need a hepatitis B antibody titer as well.   She is asking that we add this on today. Checked with lab, and ok to add it.  (not sure if it would be better to add this from the encounter yesterday?)  Please order if ok.       Rosario Bellamy, EARL  Triage Nurse

## 2018-03-28 LAB
HBV SURFACE AB SERPL IA-ACNC: 46.87 M[IU]/ML
HBV SURFACE AG SERPL QL IA: NONREACTIVE
M TB TUBERC IFN-G BLD QL: NEGATIVE
M TB TUBERC IFN-G/MITOGEN IGNF BLD: 0.15 IU/ML
MEV IGG SER QL IA: 1.5 AI (ref 0–0.8)
MUV IGG SER QL IA: 3.5 AI (ref 0–0.8)
RUBV IGG SERPL IA-ACNC: 43 IU/ML
VZV IGG SER QL IA: >8 AI (ref 0–0.8)

## 2018-04-05 ENCOUNTER — OFFICE VISIT (OUTPATIENT)
Dept: FAMILY MEDICINE | Facility: CLINIC | Age: 48
End: 2018-04-05
Payer: COMMERCIAL

## 2018-04-05 VITALS
BODY MASS INDEX: 25.61 KG/M2 | WEIGHT: 150 LBS | DIASTOLIC BLOOD PRESSURE: 72 MMHG | RESPIRATION RATE: 18 BRPM | HEART RATE: 85 BPM | OXYGEN SATURATION: 100 % | TEMPERATURE: 98.8 F | SYSTOLIC BLOOD PRESSURE: 122 MMHG | HEIGHT: 64 IN

## 2018-04-05 DIAGNOSIS — T75.3XXA MOTION SICKNESS, INITIAL ENCOUNTER: ICD-10-CM

## 2018-04-05 DIAGNOSIS — J01.90 ACUTE RHINOSINUSITIS: Primary | ICD-10-CM

## 2018-04-05 PROCEDURE — 99213 OFFICE O/P EST LOW 20 MIN: CPT | Performed by: PHYSICIAN ASSISTANT

## 2018-04-05 RX ORDER — SCOLOPAMINE TRANSDERMAL SYSTEM 1 MG/1
PATCH, EXTENDED RELEASE TRANSDERMAL
Qty: 6 PATCH | Refills: 0 | Status: SHIPPED | OUTPATIENT
Start: 2018-04-05 | End: 2018-12-13

## 2018-04-05 RX ORDER — SCOLOPAMINE TRANSDERMAL SYSTEM 1 MG/1
PATCH, EXTENDED RELEASE TRANSDERMAL
Qty: 2 PATCH | Refills: 0 | Status: SHIPPED | OUTPATIENT
Start: 2018-04-05 | End: 2018-04-05

## 2018-04-05 NOTE — PROGRESS NOTES
SUBJECTIVE:   Billie Tavarez is a 47 year old female who presents to clinic today for the following health issues:    RESPIRATORY SYMPTOMS    Duration: F/U     Description  nasal congestion and ear pain bilateral    Severity: moderate    Accompanying signs and symptoms: Ears feel plugged     History (predisposing factors):  none    Precipitating or alleviating factors: None    Therapies tried and outcome:  Sudafed     -Patient presents with persistent ear plugging and congestion  -She did a lengthy trial of sudafed, which improved symptoms slightly but when she stopped the therapy, symptoms returned  -With return, she did start the sudafed again but it has been less effective  -now with more ear pain, frontal congestion/pain  -minor pnd though not has bad as before  -no tooth pain  -no sore throat      Problem list and histories reviewed & adjusted, as indicated.  Additional history: as documented    Patient Active Problem List   Diagnosis     Mild major depression (H)     CARDIOVASCULAR SCREENING; LDL GOAL LESS THAN 160     GERD (gastroesophageal reflux disease)     Atopic rhinitis     Family history of diabetes mellitus     Family history of coronary artery disease     Elevated rheumatoid factor     Obesity     Anxiety     Diarrhea     Colon polyp     Rash     Rosacea     Inflamed seborrheic keratosis     Dyschromia     Vitamin D deficiency     Past Surgical History:   Procedure Laterality Date     ABDOMINOPLASTY  11/15/2017     ARTHROPLASTY HIP      R     ENHANCE LASER REFRACTIVE EXISTING PT IN PARAMETERS Right 2/20/2017    Procedure: ENHANCE LASER REFRACTIVE EXISTING PT IN PARAMETERS;  Surgeon: Leandro Martinez MD;  Location: [facility]     EXCISE MASS VULVA Left 11/13/2017    Procedure: EXCISE MASS VULVA;  Left Labium Excision Of Vulvar Cyst ;  Surgeon: Merline Wellington MD;  Location: [facility]     LASIK Left 1/18/2016    Procedure: LASIK;  Surgeon: Leandro Martinez MD;  Location: [facility]     LASIK CUSTOMVUE Right  "1/9/2017    Procedure: LASIK CUSTOMVUE;  Surgeon: Leandro Martienz MD;  Location: [facility]     MAMMOPLASTY REDUCTION  11/15/2017    pathology was negative      TONSILLECTOMY       TUBAL LIGATION       WAVESCAN SCREENING Left 1/18/2016    Procedure: WAVESCAN SCREENING;  Surgeon: Leandro Martinez MD;  Location:  [facility]     WAVESCAN SCREENING Right 1/9/2017    Procedure: WAVESCAN SCREENING;  Surgeon: Leandro Martinez MD;  Location:  [facility]     WAVESCAN SCREENING Right 2/20/2017    Procedure: WAVESCAN SCREENING;  Surgeon: Leandro Martinez MD;  Location: [facility]       Social History   Substance Use Topics     Smoking status: Former Smoker     Packs/day: 0.50     Years: 6.00     Types: Cigarettes     Quit date: 3/3/1995     Smokeless tobacco: Never Used      Comment: quit 1994     Alcohol use 0.0 - 2.4 oz/week     0 - 4 Standard drinks or equivalent per week      Comment: socially     Family History   Problem Relation Age of Onset     DIABETES Mother      Hypertension Mother      C.A.D. Mother      2 stents     Arthritis Maternal Grandmother      osteo     C.A.D. Paternal Grandfather 47     MENTAL ILLNESS Daughter      Mood Disorder     C.A.D. Paternal Uncle            Reviewed and updated as needed this visit by clinical staff       Reviewed and updated as needed this visit by Provider         ROS:  Constitutional, HEENT, cardiovascular, pulmonary, gi and gu systems are negative, except as otherwise noted.    OBJECTIVE:     /72 (BP Location: Right arm, Patient Position: Chair, Cuff Size: Adult Regular)  Pulse 85  Temp 98.8  F (37.1  C) (Tympanic)  Resp 18  Ht 5' 4\" (1.626 m)  Wt 150 lb (68 kg)  SpO2 100%  BMI 25.75 kg/m2  Body mass index is 25.75 kg/(m^2).  GENERAL: healthy, alert and no distress  EYES: Eyes grossly normal to inspection, PERRL and conjunctivae and sclerae normal  HENT: normal cephalic/atraumatic, both ears: clear effusion and mildly pressured, nasal mucosa edematous , rhinorrhea white, oropharynx " raw and sinuses: frontal tenderness  NECK: no adenopathy    Diagnostic Test Results:  none     ASSESSMENT/PLAN:   1. Acute rhinosinusitis  Start on below. Continue with decongestant  - amoxicillin-clavulanate (AUGMENTIN) 875-125 MG per tablet; Take 1 tablet by mouth 2 times daily  Dispense: 20 tablet; Refill: 0    2. Motion sickness, initial encounter  - scopolamine (TRANSDERM) 72 hr patch; Apply 1 patch to hairless area behind one ear at least 4 hours before travel.  Remove old patch and change every 3 days (72 hours).  Dispense: 6 patch; Refill: 0      Davey Arteaga PA-C  [facility]

## 2018-04-05 NOTE — MR AVS SNAPSHOT
"              After Visit Summary   4/5/2018    Billie Tavarez    MRN: [id number]           Patient Information     Date Of Birth          1970        Visit Information        Provider Department      4/5/2018 8:20 AM Davey Arteaga PA-C [facility] [place]        Today's Diagnoses     Acute rhinosinusitis    -  1    Motion sickness, initial encounter           Follow-ups after your visit        Who to contact     If you have questions or need follow up information about today's clinic visit or your schedule please contact [facility] directly at [phone].  Normal or non-critical lab and imaging results will be communicated to you by OnTrack Imaginghart, letter or phone within 4 business days after the clinic has received the results. If you do not hear from us within 7 days, please contact the clinic through LogoGardent or phone. If you have a critical or abnormal lab result, we will notify you by phone as soon as possible.  Submit refill requests through nothingGrinder or call your pharmacy and they will forward the refill request to us. Please allow 3 business days for your refill to be completed.          Additional Information About Your Visit        MyChart Information     nothingGrinder gives you secure access to your electronic health record. If you see a primary care provider, you can also send messages to your care team and make appointments. If you have questions, please call your primary care clinic.  If you do not have a primary care provider, please call [phone] and they will assist you.        Care EveryWhere ID     This is your Care EveryWhere ID. This could be used by other organizations to access your [place] medical records  [id number]        Your Vitals Were     Pulse Temperature Respirations Height Pulse Oximetry BMI (Body Mass Index)    85 98.8  F (37.1  C) (Tympanic) 18 5' 4\" (1.626 m) 100% 25.75 kg/m2       Blood Pressure from Last 3 Encounters:   04/05/18 122/72   03/26/18 " 115/76   03/16/18 109/77    Weight from Last 3 Encounters:   04/05/18 150 lb (68 kg)   11/13/17 154 lb 1.6 oz (69.9 kg)   11/02/17 156 lb 3.2 oz (70.9 kg)              Today, you had the following     No orders found for display         Today's Medication Changes          These changes are accurate as of 4/5/18  8:41 AM.  If you have any questions, ask your nurse or doctor.               Start taking these medicines.        Dose/Directions    amoxicillin-clavulanate 875-125 MG per tablet   Commonly known as:  AUGMENTIN   Used for:  Acute rhinosinusitis   Started by:  Davey Arteaga PA-C        Dose:  1 tablet   Take 1 tablet by mouth 2 times daily   Quantity:  20 tablet   Refills:  0       scopolamine 72 hr patch   Commonly known as:  TRANSDERM   Used for:  Motion sickness, initial encounter   Started by:  Davey Arteaga PA-C        Apply 1 patch to hairless area behind one ear at least 4 hours before travel.  Remove old patch and change every 3 days (72 hours).   Quantity:  6 patch   Refills:  0            Where to get your medicines      These medications were sent to [facility] [place] - [address]  [address] [place] [address]     Phone:  [phone]     amoxicillin-clavulanate 875-125 MG per tablet    scopolamine 72 hr patch                Primary Care Provider Office Phone # Fax #    Davey Arteaga PA-C [phone]       [address]  [place] [address]        Equal Access to Services     [facility] : Hadii aad ku hadasho Soomaali, waaxda luqadaha, qaybta kaalmada adeegyada, waxay idiin haymeron abdi . So [facility] [phone].    ATENCIÓN: Si habla español, tiene a heard disposición servicios gratuitos de asistencia lingüística. Llame al [phone].    We comply with applicable federal civil rights laws and [place] laws. We do not discriminate on the basis of race, color, national origin, age, disability, sex,  sexual orientation, or gender identity.            Thank you!     Thank you for choosing [facility]  for your care. Our goal is always to provide you with excellent care. Hearing back from our patients is one way we can continue to improve our services. Please take a few minutes to complete the written survey that you may receive in the mail after your visit with us. Thank you!             Your Updated Medication List - Protect others around you: Learn how to safely use, store and throw away your medicines at www.disposemymeds.org.          This list is accurate as of 4/5/18  8:41 AM.  Always use your most recent med list.                   Brand Name Dispense Instructions for use Diagnosis    amoxicillin-clavulanate 875-125 MG per tablet    AUGMENTIN    20 tablet    Take 1 tablet by mouth 2 times daily    Acute rhinosinusitis       fluticasone 50 MCG/ACT spray    FLONASE    3 Bottle    Spray 1-2 sprays into both nostrils daily    Chronic allergic rhinitis, unspecified seasonality, unspecified trigger       LORazepam 1 MG tablet    ATIVAN    10 tablet    Take 1 tablet (1 mg) by mouth daily    SHON (generalized anxiety disorder)       scopolamine 72 hr patch    TRANSDERM    6 patch    Apply 1 patch to hairless area behind one ear at least 4 hours before travel.  Remove old patch and change every 3 days (72 hours).    Motion sickness, initial encounter       vitamin D 2000 UNITS tablet      Take 1 tablet by mouth daily

## 2018-06-11 ENCOUNTER — TELEPHONE (OUTPATIENT)
Dept: FAMILY MEDICINE | Facility: CLINIC | Age: 48
End: 2018-06-11

## 2018-06-11 NOTE — TELEPHONE ENCOUNTER
Pt calls.    She had some titrers done in March.  She needs a letter saying that it was ok.  She is a travel nurse.  It needs to say she is free of communicable disease and fit to work without restrictions.  She needs copies of the immunization titres also.  She is also requesting a copy of her vaccines.      She is requesting this be e-mailed to her at [email or web address].    Letter drafted.  Edit as you wish - although it does need to say that per the pt.

## 2018-06-11 NOTE — LETTER
[facility]  [address] [facility] [address] [address] [phone]          June 11, 2018        Re:  Billie Tavarez                                                                                                                     [address] [address]            To Whom it May Concern,    Billie has been seen and treated at our clinic is free of communicable disease as per our records and testing and fit to work without restrictions.    Please contact me with any concerns.    Sincerely,         Davey Arteaga PA-C

## 2018-06-11 NOTE — TELEPHONE ENCOUNTER
Letter, results and vaccine record all have been emailed to patient at her request.   Sona Rodas MA

## 2018-07-03 ENCOUNTER — MYC MEDICAL ADVICE (OUTPATIENT)
Dept: FAMILY MEDICINE | Facility: CLINIC | Age: 48
End: 2018-07-03

## 2018-07-03 DIAGNOSIS — Z11.1 SCREENING FOR TUBERCULOSIS: Primary | ICD-10-CM

## 2018-07-03 NOTE — TELEPHONE ENCOUNTER
Routing to Alejandro.    Called patient regarding reason for requested for tb skin test. 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

## 2018-07-10 NOTE — TELEPHONE ENCOUNTER
Advised patient via [facility] to make nurse-only appointments for the administration and the reading.    Silvana BELLAMY, Triage RN

## 2018-07-13 ENCOUNTER — ALLIED HEALTH/NURSE VISIT (OUTPATIENT)
Dept: NURSING | Facility: CLINIC | Age: 48
End: 2018-07-13
Payer: COMMERCIAL

## 2018-07-13 DIAGNOSIS — Z11.1 SCREENING FOR TUBERCULOSIS: ICD-10-CM

## 2018-07-13 DIAGNOSIS — Z11.1 SCREENING EXAMINATION FOR PULMONARY TUBERCULOSIS: Primary | ICD-10-CM

## 2018-07-13 PROCEDURE — 99207 ZZC NO CHARGE NURSE ONLY: CPT

## 2018-07-13 PROCEDURE — 86580 TB INTRADERMAL TEST: CPT

## 2018-07-16 ENCOUNTER — ALLIED HEALTH/NURSE VISIT (OUTPATIENT)
Dept: NURSING | Facility: CLINIC | Age: 48
End: 2018-07-16
Payer: COMMERCIAL

## 2018-07-16 DIAGNOSIS — Z11.1 SCREENING EXAMINATION FOR PULMONARY TUBERCULOSIS: Primary | ICD-10-CM

## 2018-07-16 LAB
PPDINDURATION: 0 MM (ref 0–5)
PPDREDNESS: 0 MM

## 2018-07-16 PROCEDURE — 99207 ZZC NO CHARGE NURSE ONLY: CPT

## 2018-07-31 ENCOUNTER — TELEPHONE (OUTPATIENT)
Dept: OBGYN | Facility: CLINIC | Age: 48
End: 2018-07-31

## 2018-07-31 DIAGNOSIS — N89.8 VAGINAL DISCHARGE: Primary | ICD-10-CM

## 2018-07-31 RX ORDER — METRONIDAZOLE 500 MG/1
500 TABLET ORAL 2 TIMES DAILY
Qty: 14 TABLET | Refills: 0 | Status: SHIPPED | OUTPATIENT
Start: 2018-07-31 | End: 2018-08-01 | Stop reason: ALTCHOICE

## 2018-07-31 NOTE — TELEPHONE ENCOUNTER
Patient calling to get refill for BV. Having discomfort, white discharge, no foul odor. Same symptoms that she usually gets. Per note on 2/21/18, KAYLA ok to give her prescription for Flagyl with negative wet prep. 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 Ok to send prescription? Routing to on-call. Thanks.   Dawn Villalobos, RN-BSN

## 2018-08-01 RX ORDER — METRONIDAZOLE 7.5 MG/G
1 GEL VAGINAL AT BEDTIME
Qty: 70 G | Refills: 0 | Status: SHIPPED | OUTPATIENT
Start: 2018-08-01 | End: 2018-08-06

## 2018-08-01 NOTE — TELEPHONE ENCOUNTER
Pt called requesting that the gel be sent instead of the pill for BV. Resent prescription.   Dawn Villalobos, RN-BSN

## 2018-09-02 ENCOUNTER — E-VISIT (OUTPATIENT)
Dept: FAMILY MEDICINE | Facility: CLINIC | Age: 48
End: 2018-09-02
Payer: COMMERCIAL

## 2018-09-02 DIAGNOSIS — N30.00 ACUTE CYSTITIS WITHOUT HEMATURIA: Primary | ICD-10-CM

## 2018-09-02 PROCEDURE — 99444 ZZC PHYSICIAN ONLINE EVALUATION & MANAGEMENT SERVICE: CPT | Performed by: PHYSICIAN ASSISTANT

## 2018-09-02 NOTE — MR AVS SNAPSHOT
After Visit Summary   9/2/2018    Billie Tavarez    MRN: [id number]           Patient Information     Date Of Birth          1970        Visit Information        Provider Department      9/2/2018 8:23 PM Davey Arteaga PA-C [facility]        Today's Diagnoses     Acute cystitis without hematuria    -  1       Follow-ups after your visit        Who to contact     If you have questions or need follow up information about today's clinic visit or your schedule please contact [facility] directly at [phone].  Normal or non-critical lab and imaging results will be communicated to you by RFI Informatiquehart, letter or phone within 4 business days after the clinic has received the results. If you do not hear from us within 7 days, please contact the clinic through Partnerbytet or phone. If you have a critical or abnormal lab result, we will notify you by phone as soon as possible.  Submit refill requests through [employer] or call your pharmacy and they will forward the refill request to us. Please allow 3 business days for your refill to be completed.          Additional Information About Your Visit        MyChart Information     [employer] gives you secure access to your electronic health record. If you see a primary care provider, you can also send messages to your care team and make appointments. If you have questions, please call your primary care clinic.  If you do not have a primary care provider, please call [phone] and they will assist you.        Care EveryWhere ID     This is your Care EveryWhere ID. This could be used by other organizations to access your [place] medical records  [id number]         Blood Pressure from Last 3 Encounters:   04/05/18 122/72   03/26/18 115/76   03/16/18 109/77    Weight from Last 3 Encounters:   04/05/18 150 lb (68 kg)   11/13/17 154 lb 1.6 oz (69.9 kg)   11/02/17 156 lb 3.2 oz (70.9 kg)              Today, you had the following      No orders found for display         Today's Medication Changes          These changes are accurate as of 9/2/18 11:59 PM.  If you have any questions, ask your nurse or doctor.               Start taking these medicines.        Dose/Directions    * nitroFURantoin (macrocrystal-monohydrate) 100 MG capsule   Commonly known as:  MACROBID   Used for:  Acute cystitis without hematuria        Dose:  100 mg   Take 1 capsule (100 mg) by mouth 2 times daily   Quantity:  14 capsule   Refills:  0       * nitroFURantoin (macrocrystal-monohydrate) 100 MG capsule   Commonly known as:  MACROBID   Used for:  Acute cystitis without hematuria        Dose:  100 mg   Take 1 capsule (100 mg) by mouth 2 times daily   Quantity:  14 capsule   Refills:  0       * Notice:  This list has 2 medication(s) that are the same as other medications prescribed for you. Read the directions carefully, and ask your doctor or other care provider to review them with you.         Where to get your medicines      These medications were sent to [facility]pharmacy [address] [place] [address] [place] [address]     Phone:  [phone]     nitroFURantoin (macrocrystal-monohydrate) 100 MG capsule    nitroFURantoin (macrocrystal-monohydrate) 100 MG capsule                Primary Care Provider Office Phone # Fax #    Davey Arteaga PA-C [phone] [address] [facility] [address]        Equal Access to Services     [facility] AH: Hadii cristina zelaya hadjohno Sodemetris, waaxda luqadaha, qaybta kaalmada adeegyada, ashanti minaya. So [facility] [phone].    ATENCIÓN: Si habla español, tiene a heard disposición servicios gratuitos de asistencia lingüística. Llame al [phone].    We comply with applicable federal civil rights laws and [place] laws. We do not discriminate on the basis of race, color, national origin, age, disability, sex, sexual  orientation, or gender identity.            Thank you!     Thank you for choosing [facility]  for your care. Our goal is always to provide you with excellent care. Hearing back from our patients is one way we can continue to improve our services. Please take a few minutes to complete the written survey that you may receive in the mail after your visit with us. Thank you!             Your Updated Medication List - Protect others around you: Learn how to safely use, store and throw away your medicines at www.disposemymeds.org.          This list is accurate as of 9/2/18 11:59 PM.  Always use your most recent med list.                   Brand Name Dispense Instructions for use Diagnosis    amoxicillin-clavulanate 875-125 MG per tablet    AUGMENTIN    20 tablet    Take 1 tablet by mouth 2 times daily    Acute rhinosinusitis       fluticasone 50 MCG/ACT spray    FLONASE    3 Bottle    Spray 1-2 sprays into both nostrils daily    Chronic allergic rhinitis, unspecified seasonality, unspecified trigger       LORazepam 1 MG tablet    ATIVAN    10 tablet    Take 1 tablet (1 mg) by mouth daily    SHON (generalized anxiety disorder)       * nitroFURantoin (macrocrystal-monohydrate) 100 MG capsule    MACROBID    14 capsule    Take 1 capsule (100 mg) by mouth 2 times daily    Acute cystitis without hematuria       * nitroFURantoin (macrocrystal-monohydrate) 100 MG capsule    MACROBID    14 capsule    Take 1 capsule (100 mg) by mouth 2 times daily    Acute cystitis without hematuria       scopolamine 72 hr patch    TRANSDERM    6 patch    Apply 1 patch to hairless area behind one ear at least 4 hours before travel.  Remove old patch and change every 3 days (72 hours).    Motion sickness, initial encounter       vitamin D 2000 units tablet      Take 1 tablet by mouth daily        * Notice:  This list has 2 medication(s) that are the same as other medications prescribed for you. Read the directions carefully, and ask  your doctor or other care provider to review them with you.

## 2018-09-05 RX ORDER — NITROFURANTOIN 25; 75 MG/1; MG/1
100 CAPSULE ORAL 2 TIMES DAILY
Qty: 14 CAPSULE | Refills: 0 | Status: SHIPPED | OUTPATIENT
Start: 2018-09-05 | End: 2018-12-13

## 2018-11-29 ENCOUNTER — TELEPHONE (OUTPATIENT)
Dept: FAMILY MEDICINE | Facility: CLINIC | Age: 48
End: 2018-11-29

## 2018-11-29 NOTE — TELEPHONE ENCOUNTER
Per  policy she will need to get these done/ordered by a clinic/provider where she currently is. If something were abnormal we would not be able to accept this risk as she is out of town. Please schedule in her current place of residence

## 2018-11-29 NOTE — TELEPHONE ENCOUNTER
Reason for Call: Request for an order or referral:    Order or referral being requested: pt wants lab order for  CBC, BMP    Date needed: as soon as possible    Has the patient been seen by the PCP for this problem? NO    Additional comments: please fax the orders to [phone]  Teena out pt lab at [place], [place]  It is for surgery touch up Lipo from last year.  Pt did get flu shot in October.    Phone number Patient can be reached at:  Home number on file [phone] (home)    Best Time:  any    Can we leave a detailed message on this number?  YES    Call taken on 11/29/2018 at 3:45 PM by Amy Sousa

## 2018-11-30 NOTE — TELEPHONE ENCOUNTER
Left detailed message per ok below. Adv of Alejandro Arteaga's recommendations. Call w/ questions/concerns.   Gail OLIVEIRA RN

## 2018-11-30 NOTE — TELEPHONE ENCOUNTER
The pt called back and the message was relayed and she has no further questions at this time.   Mariam Dixon on 11/30/2018 at 2:36 PM

## 2018-12-13 ENCOUNTER — OFFICE VISIT (OUTPATIENT)
Dept: FAMILY MEDICINE | Facility: CLINIC | Age: 48
End: 2018-12-13

## 2018-12-13 VITALS
RESPIRATION RATE: 14 BRPM | WEIGHT: 152.6 LBS | TEMPERATURE: 98.1 F | OXYGEN SATURATION: 100 % | BODY MASS INDEX: 26.19 KG/M2 | SYSTOLIC BLOOD PRESSURE: 98 MMHG | DIASTOLIC BLOOD PRESSURE: 64 MMHG | HEART RATE: 74 BPM

## 2018-12-13 DIAGNOSIS — E66.9 OBESITY WITHOUT SERIOUS COMORBIDITY, UNSPECIFIED CLASSIFICATION, UNSPECIFIED OBESITY TYPE: ICD-10-CM

## 2018-12-13 DIAGNOSIS — Z01.818 PREOP GENERAL PHYSICAL EXAM: Primary | ICD-10-CM

## 2018-12-13 LAB
ANION GAP SERPL CALCULATED.3IONS-SCNC: 4 MMOL/L (ref 3–14)
BUN SERPL-MCNC: 22 MG/DL (ref 7–30)
CALCIUM SERPL-MCNC: 8.9 MG/DL (ref 8.5–10.1)
CHLORIDE SERPL-SCNC: 107 MMOL/L (ref 94–109)
CO2 SERPL-SCNC: 30 MMOL/L (ref 20–32)
CREAT SERPL-MCNC: 0.88 MG/DL (ref 0.52–1.04)
ERYTHROCYTE [DISTWIDTH] IN BLOOD BY AUTOMATED COUNT: 12 % (ref 10–15)
GFR SERPL CREATININE-BSD FRML MDRD: 69 ML/MIN/1.7M2
GLUCOSE SERPL-MCNC: 77 MG/DL (ref 70–99)
HCT VFR BLD AUTO: 40.4 % (ref 35–47)
HGB BLD-MCNC: 13.5 G/DL (ref 11.7–15.7)
MCH RBC QN AUTO: 30.7 PG (ref 26.5–33)
MCHC RBC AUTO-ENTMCNC: 33.4 G/DL (ref 31.5–36.5)
MCV RBC AUTO: 92 FL (ref 78–100)
PLATELET # BLD AUTO: 319 10E9/L (ref 150–450)
POTASSIUM SERPL-SCNC: 3.9 MMOL/L (ref 3.4–5.3)
RBC # BLD AUTO: 4.4 10E12/L (ref 3.8–5.2)
SODIUM SERPL-SCNC: 141 MMOL/L (ref 133–144)
WBC # BLD AUTO: 9.7 10E9/L (ref 4–11)

## 2018-12-13 PROCEDURE — 85027 COMPLETE CBC AUTOMATED: CPT | Performed by: NURSE PRACTITIONER

## 2018-12-13 PROCEDURE — 99214 OFFICE O/P EST MOD 30 MIN: CPT | Performed by: NURSE PRACTITIONER

## 2018-12-13 PROCEDURE — 36415 COLL VENOUS BLD VENIPUNCTURE: CPT | Performed by: NURSE PRACTITIONER

## 2018-12-13 PROCEDURE — 80048 BASIC METABOLIC PNL TOTAL CA: CPT | Performed by: NURSE PRACTITIONER

## 2018-12-13 ASSESSMENT — PATIENT HEALTH QUESTIONNAIRE - PHQ9
SUM OF ALL RESPONSES TO PHQ QUESTIONS 1-9: 0
5. POOR APPETITE OR OVEREATING: SEVERAL DAYS

## 2018-12-13 ASSESSMENT — ANXIETY QUESTIONNAIRES
1. FEELING NERVOUS, ANXIOUS, OR ON EDGE: NOT AT ALL
IF YOU CHECKED OFF ANY PROBLEMS ON THIS QUESTIONNAIRE, HOW DIFFICULT HAVE THESE PROBLEMS MADE IT FOR YOU TO DO YOUR WORK, TAKE CARE OF THINGS AT HOME, OR GET ALONG WITH OTHER PEOPLE: NOT DIFFICULT AT ALL
5. BEING SO RESTLESS THAT IT IS HARD TO SIT STILL: NOT AT ALL
3. WORRYING TOO MUCH ABOUT DIFFERENT THINGS: NOT AT ALL
7. FEELING AFRAID AS IF SOMETHING AWFUL MIGHT HAPPEN: NOT AT ALL
GAD7 TOTAL SCORE: 2
6. BECOMING EASILY ANNOYED OR IRRITABLE: SEVERAL DAYS
2. NOT BEING ABLE TO STOP OR CONTROL WORRYING: NOT AT ALL

## 2018-12-13 NOTE — PROGRESS NOTES
[facility]  [address] [facility] [id number] [phone]  Dept: [phone]    PRE-OP EVALUATION:  Today's date: 2018    Billie Tavarez (: 1970) presents for pre-operative evaluation assessment as requested by Dr. Arellano.  She requires evaluation and anesthesia risk assessment prior to undergoing surgery/procedure for treatment of Lipo suction .    Fax number for surgical facility: [facility] [phone]  Primary Physician: Davey Arteaga  Type of Anesthesia Anticipated: to be determined    Patient has a Health Care Directive or Living Will:  YES     Preop Questions 2018   Who is doing your surgery? Dr. Arellano   What are you having done? Lipo suction   Date of Surgery/Procedure:    Facility or Hospital where procedure/surgery will be performed: [facility] outpt surgery center   1.  Do you have a history of Heart attack, stroke, stent, coronary bypass surgery, or other heart surgery? No   2.  Do you ever have any pain or discomfort in your chest? No   3.  Do you have a history of  Heart Failure? No   4.   Are you troubled by shortness of breath when:  walking on a level surface, or up a slight hill, or at night? No   5.  Do you currently have a cold, bronchitis or other respiratory infection? No   6.  Do you have a cough, shortness of breath, or wheezing? No   7.  Do you sometimes get pains in the calves of your legs when you walk? No   8. Do you or anyone in your family have previous history of blood clots? No   9.  Do you or does anyone in your family have a serious bleeding problem such as prolonged bleeding following surgeries or cuts? No   10. Have you ever had problems with anemia or been told to take iron pills? No   11. Have you had any abnormal blood loss such as black, tarry or bloody stools, or abnormal vaginal bleeding? No   12. Have you ever had a blood transfusion? No   13. Have you or any of your relatives ever had  problems with anesthesia? No   14. Do you have sleep apnea, excessive snoring or daytime drowsiness? No   15. Do you have any prosthetic heart valves? No   16. Do you have prosthetic joints? No   17. Is there any chance that you may be pregnant? No         HPI:     HPI related to upcoming procedure: liposuction planned.      See problem list for active medical problems.  Problems all longstanding and stable, except as noted/documented.  See ROS for pertinent symptoms related to these conditions.                        + rheumatoid factor in health hx.    Pt denies symptoms.  No need for treatment.  Cleared by rheumatology per pt report.    Mom with early CAD.  Pt reports thorough cardiac work up including normal calcium CT within the past 10 years.  She is active without cardiac symptoms.                                                                                                                                          MEDICAL HISTORY:     Patient Active Problem List    Diagnosis Date Noted     Vitamin D deficiency 04/20/2015     Priority: Medium     Problem list name updated by automated process. Provider to review       Rosacea 07/14/2013     Priority: Medium     Inflamed seborrheic keratosis 07/14/2013     Priority: Medium     Dyschromia 07/14/2013     Priority: Medium     Imo Update utility       Rash 06/18/2013     Priority: Medium     Colon polyp 05/13/2013     Priority: Medium     Family history of diabetes mellitus 11/21/2011     Priority: Medium     Family history of coronary artery disease 11/21/2011     Priority: Medium     Elevated rheumatoid factor 11/21/2011     Priority: Medium     Obesity 11/21/2011     Priority: Medium     Anxiety 11/21/2011     Priority: Medium     Diarrhea 11/21/2011     Priority: Medium     Atopic rhinitis 10/07/2011     Priority: Medium     (Problem list name updated by automated process. Provider to review and confirm.)       GERD (gastroesophageal reflux disease)  01/14/2011     Priority: Medium     Quality/mj       CARDIOVASCULAR SCREENING; LDL GOAL LESS THAN 160 10/31/2010     Priority: Medium     Mild major depression (H) 09/02/2010     Priority: Medium      Past Medical History:   Diagnosis Date     Anxiety      Gluten intolerance      IBS (irritable bowel syndrome)      Rheumatoid arthritis(714.0)     Abstracted 5/24/02.     Rosacea      Past Surgical History:   Procedure Laterality Date     ABDOMINOPLASTY  11/15/2017     ARTHROPLASTY HIP      R     ENHANCE LASER REFRACTIVE EXISTING PT IN PARAMETERS Right 2/20/2017    Procedure: ENHANCE LASER REFRACTIVE EXISTING PT IN PARAMETERS;  Surgeon: Leandro Martinez MD;  [facility]     EXCISE MASS VULVA Left 11/13/2017    Procedure: EXCISE MASS VULVA;  Left Labium Excision Of Vulvar Cyst ;  Surgeon: Merline Wellington MD;  Location: [facility]     LASIK Left 1/18/2016    Procedure: LASIK;  Surgeon: Leandro Martinez MD;  Location: [facility]     LASIK CUSTOMVUE Right 1/9/2017    Procedure: LASIK CUSTOMVUE;  Surgeon: Leandro Martinez MD;  Location: [facility]     MAMMOPLASTY REDUCTION  11/15/2017    pathology was negative      TONSILLECTOMY       TUBAL LIGATION       WAVESCAN SCREENING Left 1/18/2016    Procedure: WAVESCAN SCREENING;  Surgeon: Leandro Martinez MD;  [facility]     WAVESCAN SCREENING Right 1/9/2017    Procedure: WAVESCAN SCREENING;  Surgeon: Leandro Martinez MD;  [facility]     WAVESCAN SCREENING Right 2/20/2017    Procedure: WAVESCAN SCREENING;  Surgeon: Leandro Martinez MD;  Location: [facility]     Current Outpatient Medications   Medication Sig Dispense Refill     Cholecalciferol (VITAMIN D) 2000 UNITS tablet Take 1 tablet by mouth daily       OTC products: Last dose of ibuprofen 12/10/18    Allergies   Allergen Reactions     Sulfa Drugs Other (See Comments)     Anaphylaxis- hives, nausea, vomiting      Latex Allergy: NO    Social History     Tobacco Use     Smoking status: Former Smoker     Packs/day: 0.50     Years:  6.00     Pack years: 3.00     Types: Cigarettes     Last attempt to quit: 3/3/1995     Years since quittin.7     Smokeless tobacco: Never Used     Tobacco comment: quit    Substance Use Topics     Alcohol use: Yes     Alcohol/week: 0.0 - 2.4 oz     Comment: socially     History   Drug Use No       REVIEW OF SYSTEMS:   CONSTITUTIONAL: NEGATIVE for fever, chills, change in weight  ENT/MOUTH: minimal feeling of fluid in ears.  RESP: NEGATIVE for significant cough or SOB  CV: NEGATIVE for chest pain, palpitations or peripheral edema  GI: NEGATIVE for nausea, abdominal pain, heartburn, or change in bowel habits  : NEGATIVE for frequency, dysuria, or hematuria  NEURO: NEGATIVE for weakness, dizziness or paresthesias  ENDOCRINE: NEGATIVE for temperature intolerance, skin/hair changes  HEME: NEGATIVE for bleeding problems  PSYCHIATRIC: NEGATIVE for changes in mood or affect    EXAM:   BP 98/64 (BP Location: Right arm, Patient Position: Chair, Cuff Size: Adult Regular)   Pulse 74   Temp 98.1  F (36.7  C) (Tympanic)   Resp 14   Wt 69.2 kg (152 lb 9.6 oz)   LMP 2018 (Approximate)   SpO2 100%   BMI 26.19 kg/m      GENERAL APPEARANCE: healthy, alert and no distress     EYES: Eyes grossly normal to inspection     HENT: ear canals and TM's normal, nose and mouth without ulcers or lesions and TM fluid bilateral, minimal     NECK: no adenopathy, no asymmetry, masses, or scars and thyroid normal to palpation     RESP: lungs clear to auscultation - no rales, rhonchi or wheezes     CV: regular rates and rhythm, normal S1 S2, no S3 or S4 and no murmur, click or rub     ABDOMEN:  soft, nontender, no HSM or masses and bowel sounds normal     NEURO: Normal strength and tone, sensory exam grossly normal, mentation intact and speech normal     PSYCH: mentation appears normal. and affect normal/bright     LYMPHATICS: No cervical adenopathy    DIAGNOSTICS:   Hemoglobin (indicated for history of anemia or procedure with  significant blood loss such as tonsillectomy, major intraperitoneal surgery, vascular surgery, major spine surgery, total joint replacement)  Serum Potassium  Serum Creatinine    BMP pending    Lab Results   Component Value Date    WBC 9.7 12/13/2018     Lab Results   Component Value Date    RBC 4.40 12/13/2018     Lab Results   Component Value Date    HGB 13.5 12/13/2018     Lab Results   Component Value Date    HCT 40.4 12/13/2018     No components found for: MCT  Lab Results   Component Value Date    MCV 92 12/13/2018     Lab Results   Component Value Date    MCH 30.7 12/13/2018     Lab Results   Component Value Date    MCHC 33.4 12/13/2018     Lab Results   Component Value Date    RDW 12.0 12/13/2018     Lab Results   Component Value Date     12/13/2018         Recent Labs   Lab Test 11/02/17  1015 11/08/13  0944 08/26/13  0903   HGB 13.7 13.9 13.8   PLT  --  348 324    142 139   POTASSIUM 4.4 4.6 4.0   CR 0.82 0.78 0.60        IMPRESSION:   Reason for surgery/procedure: revision planned, previous liposuction.    Diagnosis/reason for consult: preop.  Hx of + rheumatoid factor, asymptomatic.  Mother with family hx of early CAD, pt reports normal calcium ct and cardiology consult in the past.      The proposed surgical procedure is considered LOW risk.    REVISED CARDIAC RISK INDEX  The patient has the following serious cardiovascular risks for perioperative complications such as (MI, PE, VFib and 3  AV Block):  No serious cardiac risks  INTERPRETATION: 0 risks: Class I (very low risk - 0.4% complication rate)    Exercises regularly without cardiac symptoms.    The patient has the following additional risks for perioperative complications:  No identified additional risks      ICD-10-CM    1. Preop general physical exam Z01.818 CBC with platelets     Basic metabolic panel   2. Obesity without serious comorbidity, unspecified classification, unspecified obesity type E66.9        RECOMMENDATIONS:      APPROVAL GIVEN to proceed with proposed procedure, without further diagnostic evaluation     BMP pending.    Signed Electronically by: KIMBERLY Solomon Ra CNP    Copy of this evaluation report is provided to requesting physician.    [facility] Preop Guidelines    Revised Cardiac Risk Index    Addendum  Pt did come to visit under the impression that this would be billed as a level 1 visit due to her current state of health.  She was counseled prior to the visit that this would not be the case due to the nature of the preop.  JACKY

## 2018-12-14 ASSESSMENT — ANXIETY QUESTIONNAIRES: GAD7 TOTAL SCORE: 2

## 2019-02-21 ENCOUNTER — PATIENT OUTREACH (OUTPATIENT)
Dept: CARE COORDINATION | Facility: CLINIC | Age: 49
End: 2019-02-21

## 2019-02-21 NOTE — PROGRESS NOTES
Clinic Care Coordination Contact  [facility]/Voicemail    Referral Source: Care Team (Patient Care Supervisor). Referral received to provide possible financial support, insurance concerns. Plan to also review problem list, medications with patient, and from last office visit recommended next Primary Care Provider follow up around 3/13 for physical exam - not yet scheduled.   Clinical Data: Care Coordinator Outreach  Outreach attempted x 1.  Left message on voicemail with call back information and requested return call.  Plan: Care Coordinator will try to reach patient again in 1-2 business days.    Emily Little RN Care Coordinator  [facility] [place]  Email: [email or web address]  Phone: [phone]

## 2019-02-21 NOTE — LETTER
[place] CARE COORDINATION  [address] [address]  [facility] [id number]    February 22, 2019    Billie Tavarez  [address] [place] [address] [address]  [facility] [address]      Dear Billie,    I am a clinic care coordinator who works with Davey Arteaga PA-C at [facility]. I recently tried to call and was unable to reach you. I wanted to introduce myself and provide you with my contact information so that you can call me with questions or concerns about your health care. Below is a description of clinic care coordination and how I can further assist you.     The clinic care coordinator is a registered nurse and/or  who understand the health care system. The goal of clinic care coordination is to help you manage your health and improve access to the [facility] system in the most efficient manner. The registered nurse can assist you in meeting your health care goals by providing education, coordinating services, and strengthening the communication among your providers. The  can assist you with financial, behavioral, psychosocial, chemical dependency, counseling, and/or psychiatric resources.    Please feel free to contact me at [phone], with any questions or concerns. We at [facility] are focused on providing you with the highest-quality healthcare experience possible and that all starts with you.     Sincerely,     Emily Little

## 2019-02-22 NOTE — PROGRESS NOTES
"Clinic Care Coordination Contact  [facility]/Voicemail    Referral Source: Care Team(Patient Care Supervisor)  Clinical Data: Care Coordinator Outreach  Patient returned writers call and left message stating she called prior to visit to get prices of visits. Agreeable to level 1 or 2 visit. Called prior to visit to schedule level 1 or level 2 visit \"specifically made this clear\" otherwise was not willing to be seen. Was shocked when she received a bill for a level 4 visit when she wasn't in with the provider but ten minutes. Just needed some lab work.   Outreach attempted x 2.  Left message on voicemail with call back information and requested return call. Also left number for central billing office.   Plan: Care Coordinator will mail out care coordination introduction letter with care coordinator contact information and explanation of care coordination services. Care Coordinator will do no further outreaches at this time.    Emily Little RN Care Coordinator  [facility] [place]  Email: [email or web address]  Phone: [phone]        "

## 2019-03-05 ENCOUNTER — APPOINTMENT (OUTPATIENT)
Dept: URBAN - METROPOLITAN AREA SURGERY 18 | Age: 49
Setting detail: DERMATOLOGY
End: 2019-03-05

## 2019-03-05 DIAGNOSIS — D18.0 HEMANGIOMA: ICD-10-CM

## 2019-03-05 DIAGNOSIS — L81.4 OTHER MELANIN HYPERPIGMENTATION: ICD-10-CM

## 2019-03-05 DIAGNOSIS — L81.0 POSTINFLAMMATORY HYPERPIGMENTATION: ICD-10-CM

## 2019-03-05 DIAGNOSIS — L98.8 OTHER SPECIFIED DISORDERS OF THE SKIN AND SUBCUTANEOUS TISSUE: ICD-10-CM

## 2019-03-05 DIAGNOSIS — Z71.89 OTHER SPECIFIED COUNSELING: ICD-10-CM

## 2019-03-05 DIAGNOSIS — D22 MELANOCYTIC NEVI: ICD-10-CM

## 2019-03-05 PROBLEM — D22.5 MELANOCYTIC NEVI OF TRUNK: Status: ACTIVE | Noted: 2019-03-05

## 2019-03-05 PROBLEM — D18.01 HEMANGIOMA OF SKIN AND SUBCUTANEOUS TISSUE: Status: ACTIVE | Noted: 2019-03-05

## 2019-03-05 PROBLEM — L55.1 SUNBURN OF SECOND DEGREE: Status: ACTIVE | Noted: 2019-03-05

## 2019-03-05 PROBLEM — F41.9 ANXIETY DISORDER, UNSPECIFIED: Status: ACTIVE | Noted: 2019-03-05

## 2019-03-05 PROBLEM — D23.72 OTHER BENIGN NEOPLASM OF SKIN OF LEFT LOWER LIMB, INCLUDING HIP: Status: ACTIVE | Noted: 2019-03-05

## 2019-03-05 PROCEDURE — OTHER COUNSELING: OTHER

## 2019-03-05 PROCEDURE — OTHER PRESCRIPTION: OTHER

## 2019-03-05 PROCEDURE — 99203 OFFICE O/P NEW LOW 30 MIN: CPT

## 2019-03-05 RX ORDER — TRETINOIN 0.25 MG/G
CREAM TOPICAL
Qty: 1 | Refills: 2 | Status: ERX | COMMUNITY
Start: 2019-03-05

## 2019-03-05 ASSESSMENT — LOCATION ZONE DERM
LOCATION ZONE: ARM
LOCATION ZONE: FACE
LOCATION ZONE: TRUNK

## 2019-03-05 ASSESSMENT — LOCATION SIMPLE DESCRIPTION DERM
LOCATION SIMPLE: RIGHT FOREARM
LOCATION SIMPLE: UPPER BACK
LOCATION SIMPLE: LEFT UPPER BACK
LOCATION SIMPLE: LEFT CHEEK
LOCATION SIMPLE: RIGHT CHEEK
LOCATION SIMPLE: LEFT FOREARM

## 2019-03-05 ASSESSMENT — LOCATION DETAILED DESCRIPTION DERM
LOCATION DETAILED: LEFT INFERIOR MEDIAL UPPER BACK
LOCATION DETAILED: LEFT CENTRAL MALAR CHEEK
LOCATION DETAILED: RIGHT INFERIOR CENTRAL MALAR CHEEK
LOCATION DETAILED: INFERIOR THORACIC SPINE
LOCATION DETAILED: RIGHT PROXIMAL DORSAL FOREARM
LOCATION DETAILED: LEFT PROXIMAL DORSAL FOREARM
LOCATION DETAILED: LEFT MID-UPPER BACK

## 2019-03-05 NOTE — HPI: OTHER
Condition:: Wrinkles
Please Describe Your Condition:: Patient is experiencing aging on the face. She has been experiencing this gradually for years and would like to discuss options.

## 2019-11-03 ENCOUNTER — HEALTH MAINTENANCE LETTER (OUTPATIENT)
Age: 49
End: 2019-11-03

## 2020-03-19 NOTE — ADDENDUM NOTE
Encounter addended by: Alpers, Allison E, SLP on: 3/19/2020 2:37 PM   Actions taken: Episode resolved

## 2020-11-16 ENCOUNTER — HEALTH MAINTENANCE LETTER (OUTPATIENT)
Age: 50
End: 2020-11-16

## 2021-02-07 ENCOUNTER — HEALTH MAINTENANCE LETTER (OUTPATIENT)
Age: 51
End: 2021-02-07

## 2021-03-11 NOTE — TELEPHONE ENCOUNTER
March 11, 2021       Nacho Prakash MD  [address] [address] [facility] [address]  Via Fax: [phone]      Patient: Katherin Messer   YOB: 1955   Date of Visit: 3/11/2021       Dear Dr. Prakash:    I saw your patient, Katherin Messer, for an evaluation. Below are my notes for this visit with her.    If you have questions, please do not hesitate to call me.      Sincerely,        Chanelle Castro MD        CC: No Recipients               Patient aware.  Sarah Medrano

## 2021-04-03 ENCOUNTER — HEALTH MAINTENANCE LETTER (OUTPATIENT)
Age: 51
End: 2021-04-03

## 2021-09-18 ENCOUNTER — HEALTH MAINTENANCE LETTER (OUTPATIENT)
Age: 51
End: 2021-09-18

## 2022-01-08 ENCOUNTER — HEALTH MAINTENANCE LETTER (OUTPATIENT)
Age: 52
End: 2022-01-08

## 2022-03-05 ENCOUNTER — HEALTH MAINTENANCE LETTER (OUTPATIENT)
Age: 52
End: 2022-03-05

## 2022-11-19 ENCOUNTER — HEALTH MAINTENANCE LETTER (OUTPATIENT)
Age: 52
End: 2022-11-19

## 2023-04-15 ENCOUNTER — HEALTH MAINTENANCE LETTER (OUTPATIENT)
Age: 53
End: 2023-04-15

## 2025-03-03 NOTE — ANESTHESIA PREPROCEDURE EVALUATION
Anesthesia Evaluation     . Pt has had prior anesthetic. Type: General    No history of anesthetic complications          ROS/MED HX    ENT/Pulmonary:     (+)tobacco use, Past use , . .    Neurologic:  - neg neurologic ROS     Cardiovascular:         METS/Exercise Tolerance:  >4 METS   Hematologic:  - neg hematologic  ROS       Musculoskeletal:  - neg musculoskeletal ROS       GI/Hepatic:     (+) GERD       Renal/Genitourinary:         Endo:  - neg endo ROS       Psychiatric:     (+) psychiatric history anxiety and depression      Infectious Disease:  - neg infectious disease ROS       Malignancy:      - no malignancy   Other:                   Procedure: Procedure(s):  Left Labium Excision Of Vulvar Cyst  - Wound Class: II-Clean Contaminated    HPI: Billie Tavarez is a 47 year old female who is presenting for above stated procedure.    PMHx/PSHx/ROS:  Past Medical History:   Diagnosis Date     Anxiety      Gluten intolerance      IBS (irritable bowel syndrome)      Rheumatoid arthritis(714.0)     Abstracted 5/24/02.     Rosacea        Past Surgical History:   Procedure Laterality Date     ARTHROPLASTY HIP      R     ENHANCE LASER REFRACTIVE EXISTING PT IN PARAMETERS Right 2/20/2017    Procedure: ENHANCE LASER REFRACTIVE EXISTING PT IN PARAMETERS;  Surgeon: Leandro Martinez MD;  Location: [facility]     LASIK Left 1/18/2016    Procedure: LASIK;  Surgeon: Leandro Martinez MD;  Location: [facility]     LASIK CUSTOMVUE Right 1/9/2017    Procedure: LASIK CUSTOMVUE;  Surgeon: Leandro Martinez MD;  Location: [facility]     TONSILLECTOMY       TUBAL LIGATION       WAVESCAN SCREENING Left 1/18/2016    Procedure: WAVESCAN SCREENING;  Surgeon: Leandro Martinez MD;  Location: [facility]     WAVESCAN SCREENING Right 1/9/2017    Procedure: WAVESCAN SCREENING;  Surgeon: Leandro Martinez MD;  Location: [facility]     WAVESCAN SCREENING Right 2/20/2017    Procedure: WAVESCAN SCREENING;  Surgeon: Leandro Martinez MD;  Location: [facility] as  "Called pt 844a and went over labs in full, regarding kidney numbers pt stated she has a UTI and is seeing urology today for treatment she stated at time of appt she did not experience any symptoms but later that evening she felt \"it starting to brew\" pt states she self caths and this isn't her first rodeo. Pt will see urology and do a walk in lab for 5/27 or after first floor [address]   " stated above    Soc Hx:   Social History   Substance Use Topics     Smoking status: Former Smoker     Packs/day: 0.50     Years: 6.00     Types: Cigarettes     Quit date: 3/3/1995     Smokeless tobacco: Never Used      Comment: quit 1994     Alcohol use 2.0 oz/week     4 Standard drinks or equivalent per week      Comment: socially       Allergies:   Allergies   Allergen Reactions     Sulfa Drugs Other (See Comments)     Anaphylaxis- hives, nausea, vomiting       Meds:   Prescriptions Prior to Admission   Medication Sig Dispense Refill Last Dose     fluticasone (FLONASE) 50 MCG/ACT nasal spray Spray 1-2 sprays into both nostrils daily 1 Bottle 1 11/12/2017 at 0800     LORazepam (ATIVAN) 0.5 MG tablet Take 1 tablet (0.5 mg) by mouth as needed for anxiety 10 tablet 0 More than a month at Unknown time     Cholecalciferol (VITAMIN D) 2000 UNITS tablet Take 1 tablet by mouth daily   More than a month at Unknown time       No current outpatient prescriptions on file.       Physical Exam:  VS: Temp:  [36.6  C (97.9  F)] 36.6  C (97.9  F)  Heart Rate:  [73] 73  Resp:  [16] 16  BP: (127)/(77) 127/77  SpO2:  [97 %] 97 %   97%, Weight   Wt Readings from Last 2 Encounters:   11/13/17 69.9 kg (154 lb 1.6 oz)   11/02/17 70.9 kg (156 lb 3.2 oz)       Labs:    BMP:  Recent Labs   Lab Test  11/02/17   1015   NA  140   POTASSIUM  4.4   CHLORIDE  105   CO2  27   BUN  17   CR  0.82   GLC  78   KATHE  8.9     LFTs:   Recent Labs   Lab Test  11/08/13   0944   PROTTOTAL  7.2   ALBUMIN  4.1   BILITOTAL  0.7   ALKPHOS  74   AST  28   ALT  34     CBC:   Recent Labs   Lab Test  11/02/17   1015  11/08/13   0944   WBC   --   7.3   RBC   --   4.65   HGB  13.7  13.9   HCT   --   41.3   MCV   --   89   MCH   --   29.9   MCHC   --   33.7   RDW   --   13.4   PLT   --   348     Coags:  No results for input(s): INR, PTT, FIBR in the last 91597 hours.        Physical Exam  Normal systems: dental    Airway   Mallampati: I  TM distance: >3 FB  Neck ROM:  full    Dental     Cardiovascular   Rhythm and rate: regular and normal      Pulmonary    breath sounds clear to auscultation                    Anesthesia Plan      History & Physical Review  History and physical reviewed and following examination; no interval change.    ASA Status:  2 .    NPO Status:  > 6 hours    Plan for MAC with Propofol induction. Maintenance will be TIVA.  Reason for MAC:  Deep or markedly invasive procedure (G8)  PONV prophylaxis:  Ondansetron (or other 5HT-3) and Dexamethasone or Solumedrol  -patient has a h/o tubal. Refused to do urine hcg.      Postoperative Care  Postoperative pain management:  Multi-modal analgesia.      Consents  Anesthetic plan, risks, benefits and alternatives discussed with:  Patient.  Use of blood products discussed: No .   .        I have personally discussed the risks and benefits of anesthesia for this procedure with the patient.     Yahir Troncoso MD  Anesthesiology                  .

## (undated) DEVICE — RX BACITRACIN OINTMENT 0.9G 1/32OZ 01680 11109

## (undated) DEVICE — NDL 18GA 1.5" 305196

## (undated) DEVICE — ESU ELEC NDL 1" E1552

## (undated) DEVICE — ESU PENCIL W/HOLSTER E2350H

## (undated) DEVICE — SU VICRYL 4-0 RB-1 27" J304

## (undated) DEVICE — BLADE KNIFE SURG 15 371115

## (undated) DEVICE — Device

## (undated) DEVICE — SOL NACL 0.9% IRRIG 1000ML BOTTLE 2F7124

## (undated) DEVICE — SYR 01ML 27GA 0.5" NDL TBC 309623

## (undated) DEVICE — LINEN GOWN X4 5410

## (undated) DEVICE — SOL WATER IRRIG 1000ML BOTTLE 2F7114

## (undated) DEVICE — NDL COUNTER 20CT 31142493

## (undated) DEVICE — LINEN TOWEL PACK X5 5464

## (undated) DEVICE — GLOVE PROTEXIS W/NEU-THERA 7.5  2D73TE75

## (undated) DEVICE — DRSG VASELINE 3X18" 8884414600

## (undated) DEVICE — DECANTER TRANSFER DEVICE 2008S

## (undated) DEVICE — PAD CHUX UNDERPAD 30X30"

## (undated) DEVICE — STRAP KNEE/BODY 31143004

## (undated) DEVICE — ESU GROUND PAD UNIVERSAL W/O CORD

## (undated) RX ORDER — FENTANYL CITRATE 50 UG/ML
INJECTION, SOLUTION INTRAMUSCULAR; INTRAVENOUS
Status: DISPENSED
Start: 2017-11-13

## (undated) RX ORDER — DEXAMETHASONE SODIUM PHOSPHATE 4 MG/ML
INJECTION, SOLUTION INTRA-ARTICULAR; INTRALESIONAL; INTRAMUSCULAR; INTRAVENOUS; SOFT TISSUE
Status: DISPENSED
Start: 2017-11-13

## (undated) RX ORDER — LIDOCAINE HYDROCHLORIDE 20 MG/ML
INJECTION, SOLUTION EPIDURAL; INFILTRATION; INTRACAUDAL; PERINEURAL
Status: DISPENSED
Start: 2017-11-13

## (undated) RX ORDER — PROPOFOL 10 MG/ML
INJECTION, EMULSION INTRAVENOUS
Status: DISPENSED
Start: 2017-11-13

## (undated) RX ORDER — IBUPROFEN 400 MG/1
TABLET, FILM COATED ORAL
Status: DISPENSED
Start: 2017-02-20

## (undated) RX ORDER — DIAZEPAM 5 MG
TABLET ORAL
Status: DISPENSED
Start: 2017-02-20

## (undated) RX ORDER — ONDANSETRON 2 MG/ML
INJECTION INTRAMUSCULAR; INTRAVENOUS
Status: DISPENSED
Start: 2017-11-13

## (undated) RX ORDER — HYDROCODONE BITARTRATE AND ACETAMINOPHEN 5; 325 MG/1; MG/1
TABLET ORAL
Status: DISPENSED
Start: 2017-11-13